# Patient Record
Sex: FEMALE | Race: WHITE | Employment: OTHER | URBAN - METROPOLITAN AREA
[De-identification: names, ages, dates, MRNs, and addresses within clinical notes are randomized per-mention and may not be internally consistent; named-entity substitution may affect disease eponyms.]

---

## 2017-01-16 DIAGNOSIS — E53.8 VITAMIN B12 DEFICIENCY (NON ANEMIC): ICD-10-CM

## 2017-01-16 DIAGNOSIS — F03.90 DEMENTIA (H): Primary | ICD-10-CM

## 2017-02-03 ENCOUNTER — TELEPHONE (OUTPATIENT)
Dept: INTERNAL MEDICINE | Facility: CLINIC | Age: 82
End: 2017-02-03

## 2017-02-03 NOTE — TELEPHONE ENCOUNTER
Spoke with Nnamdi.  They received a summons to appear in court on 2-08-17 re [facility] membership that someone tried to sell pt.  Nnamdi is bringing a letter from his brother, who is a , that he would like for PMD to look over and sign, to excuse Camille from court due to her dementia/alzheimers.  Nnamdi, who is TALYA will be going in her place.   Will await letter.

## 2017-02-06 ENCOUNTER — MYC MEDICAL ADVICE (OUTPATIENT)
Dept: INTERNAL MEDICINE | Facility: CLINIC | Age: 82
End: 2017-02-06

## 2017-02-06 NOTE — Clinical Note
[address] [address]  [phone]        February 7, 2017    [facility] Conciliation Court  Room [address] [facility] [address]    Patient: Camille Vang  Re: Alice Vang and Shamar Torres  Court File Number: [id number]  Conciliation Hearing Date: February 8, 2017 at 9:00 a.m.      Your Honor:    Camille Vang has been one of my patients since October 2014 when I was first asked to evaluate her for issues of memory loss. Camille  has been incapable of managing her own financial affairs, driving a car and performing a number of activities of daily living since then. She has a considerable amount of memory loss and is currently  being treated with Donepizil/Aricept to slow the progression of this disease.    In my opinion as her physician, it would not be advisable for Camille to attend and testify at the Conciliation Hearing that is described above. The proceedings would likely be very stressful for her and she would not understand them fully. She also has a history of some urinary incontinence and the stress of attending the hearing might cause her to have an incontinence episode during the hearing, which would be embarrassing and increasingly  stressful for her. If you have further questions regarding this matter, please feel free to contact me at [phone].  Sincerely,        Jeevan Obando MD  Internal Medicine Department  [facility]

## 2017-06-07 DIAGNOSIS — E03.9 HYPOTHYROIDISM: ICD-10-CM

## 2017-06-07 RX ORDER — LEVOTHYROXINE SODIUM 112 UG/1
TABLET ORAL
Qty: 15 TABLET | Refills: 0 | Status: SHIPPED | OUTPATIENT
Start: 2017-06-07 | End: 2017-10-10

## 2017-06-07 NOTE — TELEPHONE ENCOUNTER
levothyroxine (SYNTHROID, LEVOTHROID) 112 MCG tablet     Last Written Prescription Date: 11/04/2016  Last Quantity: 45, # refills: 1  Last Office Visit with [facility], [facility] or [facility] prescribing provider: 11/11/2016        TSH   Date Value Ref Range Status   03/28/2016 2.36 0.40 - 4.00 mU/L Final

## 2017-06-17 ENCOUNTER — HEALTH MAINTENANCE LETTER (OUTPATIENT)
Age: 82
End: 2017-06-17

## 2017-06-26 ENCOUNTER — MEDICAL CORRESPONDENCE (OUTPATIENT)
Dept: HEALTH INFORMATION MANAGEMENT | Facility: CLINIC | Age: 82
End: 2017-06-26

## 2017-06-27 ENCOUNTER — TRANSFERRED RECORDS (OUTPATIENT)
Dept: HEALTH INFORMATION MANAGEMENT | Facility: CLINIC | Age: 82
End: 2017-06-27

## 2017-08-21 ENCOUNTER — TRANSFERRED RECORDS (OUTPATIENT)
Dept: HEALTH INFORMATION MANAGEMENT | Facility: CLINIC | Age: 82
End: 2017-08-21

## 2017-08-21 LAB
CREAT SERPL-MCNC: 0.85 MG/DL (ref 0.6–1.1)
CREAT SERPL-MCNC: 0.85 MG/DL (ref 0.6–1.1)
GFR SERPL CREATININE-BSD FRML MDRD: >60 ML/MIN/1.73M2
GFR SERPL CREATININE-BSD FRML MDRD: >60 ML/MIN/1.73M2
GLUCOSE SERPL-MCNC: 94 MG/DL (ref 70–125)
GLUCOSE SERPL-MCNC: 94 MG/DL (ref 70–125)
INR PPP: 1.07 (ref 0.9–1.1)
POTASSIUM SERPL-SCNC: 4.3 MMOL/L (ref 3.5–5)
POTASSIUM SERPL-SCNC: 4.3 MMOL/L (ref 3.5–5)

## 2017-08-22 ENCOUNTER — TELEPHONE (OUTPATIENT)
Dept: INTERNAL MEDICINE | Facility: CLINIC | Age: 82
End: 2017-08-22

## 2017-08-22 NOTE — TELEPHONE ENCOUNTER
Reason for Call:  Other call back    Detailed comments: A nurse from [facility] is calling about the pt's B12 shot.  They use an outside service to give the shots.  In order for the sevice to give the shot, the diagnosis code needs to be changed to permicious anemia.  Fax # is [phone].  Phone Number Patient can be reached at: Other phone number:  [phone]  Alice    Best Time: before 3:30pm    Can we leave a detailed message on this number? YES    Call taken on 8/22/2017 at 3:16 PM by ARJUN BARROS

## 2017-08-22 NOTE — TELEPHONE ENCOUNTER
See same request 11/11/16. Pt does NOT have pernicious anemia and dx will NOT be changed to that.

## 2017-08-23 ENCOUNTER — TELEPHONE (OUTPATIENT)
Dept: INTERNAL MEDICINE | Facility: CLINIC | Age: 82
End: 2017-08-23

## 2017-08-23 NOTE — TELEPHONE ENCOUNTER
NO information in chart available to review re: any recent hospital visit.  OK for Tylenol 1000mg TID scheduled for pain.  If not controlling sx, then pt to be seen in clinic by me to evaluate further. Inform nurse from [facility] and also have her fax any information they have re: where pt was  seen yesterday for evaluation

## 2017-08-23 NOTE — TELEPHONE ENCOUNTER
Alice, nurse from [facility] called and stated that patient returned from the hospital yesterday post fall. Patient has no fx to left arm but it is in a sling. PRN dosage of Tylenol is not helping the patient with the pain and nurse is asking for PCP to schedule the Tylenol.    PCP please review and advice.    Thank you

## 2017-08-24 ENCOUNTER — TELEPHONE (OUTPATIENT)
Dept: INTERNAL MEDICINE | Facility: CLINIC | Age: 82
End: 2017-08-24

## 2017-08-24 DIAGNOSIS — Z91.81 PERSONAL HISTORY OF FALL: Primary | ICD-10-CM

## 2017-08-24 DIAGNOSIS — F03.90 DEMENTIA WITHOUT BEHAVIORAL DISTURBANCE, UNSPECIFIED DEMENTIA TYPE: ICD-10-CM

## 2017-08-24 NOTE — TELEPHONE ENCOUNTER
Patient was admitted into [facility] after a fall, Doctor thought it would be a great time for patient to start using a walker. Hospital suggested that patient contact PCP to initiate request. Son is requesting a walker w/seat, wheels in front only sent to [facility] in [place] Address: [address] [place] [address], Phone: [phone], Fax: [phone]

## 2017-08-24 NOTE — TELEPHONE ENCOUNTER
Have not seen a 2 wheel walker that also comes with a seat. Would recommend 4 wheel walker with brakes and seat. Rx for that printed and in [place] basket. Please fax to [facility] as requested. Inform pt's son.

## 2017-08-24 NOTE — TELEPHONE ENCOUNTER
Writer called and updated nurse Alise. Alise verbalized she will fax over the records. Patient was seen in a [facility]. Gave a verbal order for the tylenol.     Pending fax from the AL.

## 2017-08-25 NOTE — TELEPHONE ENCOUNTER
The patient's Nursing Home has been notified of this information and all questions answered. Will contact family to let them know.

## 2017-09-20 DIAGNOSIS — E53.8 VITAMIN B12 DEFICIENCY (NON ANEMIC): ICD-10-CM

## 2017-09-21 RX ORDER — CYANOCOBALAMIN 1000 UG/ML
INJECTION, SOLUTION INTRAMUSCULAR; SUBCUTANEOUS
Qty: 1 ML | Refills: 11 | Status: SHIPPED | OUTPATIENT
Start: 2017-09-21 | End: 2018-04-21

## 2017-09-21 NOTE — TELEPHONE ENCOUNTER
b12        Last Written Prescription Date: 11/6/16  Last Fill Quantity: 1,    # refills: 11  Last Office Visit with [facility] or [facility] prescribing provider:  11/11/17      Lab Results   Component Value Date    WBC 6.0 03/28/2016     Lab Results   Component Value Date    RBC 3.88 03/28/2016     Lab Results   Component Value Date    HGB 13.2 03/28/2016     Lab Results   Component Value Date    HCT 39.8 03/28/2016     No components found for: MCT  Lab Results   Component Value Date     03/28/2016     Lab Results   Component Value Date    MCH 34.0 03/28/2016     Lab Results   Component Value Date    MCHC 33.2 03/28/2016     Lab Results   Component Value Date    RDW 11.9 03/28/2016     Lab Results   Component Value Date     03/28/2016     Lab Results   Component Value Date    AST 20 03/28/2016     Lab Results   Component Value Date    ALT 25 03/28/2016     Creatinine   Date Value Ref Range Status   08/21/2017 0.85 0.60 - 1.10 mg/dL Final

## 2017-09-30 ENCOUNTER — HEALTH MAINTENANCE LETTER (OUTPATIENT)
Age: 82
End: 2017-09-30

## 2017-10-03 DIAGNOSIS — F03.90 DEMENTIA WITHOUT BEHAVIORAL DISTURBANCE, UNSPECIFIED DEMENTIA TYPE: ICD-10-CM

## 2017-10-03 RX ORDER — DONEPEZIL HYDROCHLORIDE 10 MG/1
TABLET, FILM COATED ORAL
Qty: 30 TABLET | Refills: 1 | Status: SHIPPED | OUTPATIENT
Start: 2017-10-03 | End: 2017-12-03

## 2017-10-10 DIAGNOSIS — E53.8 B12 DEFICIENCY: ICD-10-CM

## 2017-10-10 DIAGNOSIS — E03.9 HYPOTHYROIDISM, UNSPECIFIED TYPE: Primary | ICD-10-CM

## 2017-10-10 DIAGNOSIS — M19.91 PRIMARY OSTEOARTHRITIS, UNSPECIFIED SITE: ICD-10-CM

## 2017-10-10 NOTE — TELEPHONE ENCOUNTER
MAPA 500 mg      Last Written Prescription Date:  11/9/16  Last Fill Quantity: 100,   # refills: 11  Last Office Visit with [facility] or [facility] prescribing provider: 11/11/16  Future Office visit:       Medication is being filled for 1 time refill only due to:  Patient needs to be seen because will be due for office visit prior to next refill. .      Levothyroxine      Last Written Prescription Date: 6/7/17  Last Quantity: 15, # refills: 0  Last Office Visit with [facility] or [facility] prescribing provider: 11/11/16        TSH   Date Value Ref Range Status   03/28/2016 2.36 0.40 - 4.00 mU/L Final       Routing refill request to provider for review/approval because:  Karen given x1 and patient did not follow up, please advise

## 2017-10-10 NOTE — LETTER
.[facility]  [address] [address]  [phone] (appt)  [phone] (nurse line)    October 13, 2017      Camille Vang                                                                                                                   [facility] [facility] [address]              Dear Camille,    In reviewing your recent refill request for MAPAP 500 MG tablet and Levothyroxine , it was noted that you are due for the following:     Follow-up and Lab appointment with your physician within the next 4 weeks    Approval for a 30-day supply of the medication has been sent to your pharmacy.  Additional refills will be approved at your follow up visit.     Please contact our office at [phone] to schedule your doctor's appointment.          Sincerely,      [facility]

## 2017-10-11 RX ORDER — LEVOTHYROXINE SODIUM 112 UG/1
TABLET ORAL
Qty: 15 TABLET | Refills: 0 | Status: SHIPPED | OUTPATIENT
Start: 2017-10-11 | End: 2018-01-05 | Stop reason: DRUGHIGH

## 2017-10-11 NOTE — TELEPHONE ENCOUNTER
Call pt's son. Pt last sen Nov 2016 and last thyroid labs > 1 year ago./ Levothyroxine Rx done for 30 days. Pt will need f/u labs done in lab in the next  3 weeks and then see me a few days later if that is possible or, if hard getitng pt to clinic twice, then pt to see me within the next 4 weeks for follow-up and will then have to have labs drawn at that appt and address med refills once lab results back

## 2017-10-13 NOTE — TELEPHONE ENCOUNTER
Patient's son is notified via detailed voicemail. Has CTC on file. Letter will be sent in addition to message sent.  Encounter Closed

## 2017-10-18 DIAGNOSIS — R51.9 CHRONIC NONINTRACTABLE HEADACHE, UNSPECIFIED HEADACHE TYPE: ICD-10-CM

## 2017-10-18 DIAGNOSIS — G89.29 CHRONIC NONINTRACTABLE HEADACHE, UNSPECIFIED HEADACHE TYPE: ICD-10-CM

## 2017-10-18 NOTE — TELEPHONE ENCOUNTER
Gabapentin 100 mg      Last Written Prescription Date:  11/6/16  Last Fill Quantity: 60,   # refills: 11  Future Office visit:       Routing refill request to provider for review/approval because:  Drug not on the [facility], [facility] or [facility] refill protocol or controlled substance

## 2017-10-19 NOTE — TELEPHONE ENCOUNTER
[facility] is calling for pt, requesting RX refill for gabapentin (they say she is out of pills).  RX is pending, please refill if appropriate.      Once RX is filled,  Senior Living needs a copy of RX faxed over for their files.  Please print order for gabapentin (NEURONTIN) 100 MG capsule, and then  FAX  # [phone].    LOV with Dr. Obando was 11/11/16.

## 2017-10-20 RX ORDER — GABAPENTIN 100 MG/1
CAPSULE ORAL
Qty: 60 CAPSULE | Refills: 1 | Status: SHIPPED | OUTPATIENT
Start: 2017-10-20 | End: 2017-12-01

## 2017-10-20 NOTE — TELEPHONE ENCOUNTER
Gabapentin  Refilled electronically to [facility] as loaded by nursing staff. I am out of the office and not able to print a copy of the Rx  And sign to send to [facility]. RF done for 1 month and 1 additional refill. > 1 year since pt seen in clinic and son has been notified per refill 1010/17 that pt needs a f/u appt with me in clinic. May request that a partner in clinic today reprint the Gabapentin Rx and sign to have faxed to [facility] or contact them to let them know Rx has been refilled and they can send us any necessary form next week  that I would need to sign  In the future confirming that pt still on Gabapentin though seems unnecessary if pt's medical records there already show that pt on that med or should be able to take verbal order from us that pt to remain on Gabapntin for now pending future appt with me

## 2017-11-07 ENCOUNTER — TELEPHONE (OUTPATIENT)
Dept: INTERNAL MEDICINE | Facility: CLINIC | Age: 82
End: 2017-11-07

## 2017-11-07 NOTE — TELEPHONE ENCOUNTER
OK for UA/UC. Have results faxed to clinic when available to review. If sx worsen between now and when receive lab results, then pt to be seen

## 2017-11-07 NOTE — TELEPHONE ENCOUNTER
Nurse at senior living facility calling and asking if they can do an UA/UC on patient.  States that for the past 2 days she is acting more confused and agitated.  Afebrile.  Would PCP okay?

## 2017-11-09 ENCOUNTER — TRANSFERRED RECORDS (OUTPATIENT)
Dept: HEALTH INFORMATION MANAGEMENT | Facility: CLINIC | Age: 82
End: 2017-11-09

## 2017-11-11 DIAGNOSIS — M19.91 PRIMARY OSTEOARTHRITIS, UNSPECIFIED SITE: ICD-10-CM

## 2017-11-14 RX ORDER — ACETAMINOPHEN 500 MG
TABLET ORAL
Qty: 180 TABLET | Refills: 5 | Status: SHIPPED | OUTPATIENT
Start: 2017-11-14 | End: 2018-04-25

## 2017-12-01 ENCOUNTER — OFFICE VISIT (OUTPATIENT)
Dept: INTERNAL MEDICINE | Facility: CLINIC | Age: 82
End: 2017-12-01
Payer: MEDICARE

## 2017-12-01 VITALS
TEMPERATURE: 98.9 F | DIASTOLIC BLOOD PRESSURE: 62 MMHG | WEIGHT: 161 LBS | HEART RATE: 88 BPM | OXYGEN SATURATION: 94 % | SYSTOLIC BLOOD PRESSURE: 110 MMHG | BODY MASS INDEX: 29.45 KG/M2

## 2017-12-01 DIAGNOSIS — Z23 NEED FOR PNEUMOCOCCAL VACCINATION: ICD-10-CM

## 2017-12-01 DIAGNOSIS — F03.90 DEMENTIA WITHOUT BEHAVIORAL DISTURBANCE, UNSPECIFIED DEMENTIA TYPE: ICD-10-CM

## 2017-12-01 DIAGNOSIS — E03.9 HYPOTHYROIDISM, UNSPECIFIED TYPE: ICD-10-CM

## 2017-12-01 DIAGNOSIS — Z00.00 MEDICARE ANNUAL WELLNESS VISIT, INITIAL: Primary | ICD-10-CM

## 2017-12-01 DIAGNOSIS — Z23 NEED FOR PROPHYLACTIC VACCINATION AND INOCULATION AGAINST INFLUENZA: ICD-10-CM

## 2017-12-01 DIAGNOSIS — E53.8 B12 DEFICIENCY: ICD-10-CM

## 2017-12-01 LAB
ANION GAP SERPL CALCULATED.3IONS-SCNC: 8 MMOL/L (ref 3–14)
BUN SERPL-MCNC: 17 MG/DL (ref 7–30)
CALCIUM SERPL-MCNC: 9.3 MG/DL (ref 8.5–10.1)
CHLORIDE SERPL-SCNC: 102 MMOL/L (ref 94–109)
CO2 SERPL-SCNC: 27 MMOL/L (ref 20–32)
CREAT SERPL-MCNC: 0.86 MG/DL (ref 0.52–1.04)
ERYTHROCYTE [DISTWIDTH] IN BLOOD BY AUTOMATED COUNT: 12.5 % (ref 10–15)
GFR SERPL CREATININE-BSD FRML MDRD: 63 ML/MIN/1.7M2
GLUCOSE SERPL-MCNC: 96 MG/DL (ref 70–99)
HCT VFR BLD AUTO: 39.2 % (ref 35–47)
HGB BLD-MCNC: 13.4 G/DL (ref 11.7–15.7)
MCH RBC QN AUTO: 35.4 PG (ref 26.5–33)
MCHC RBC AUTO-ENTMCNC: 34.2 G/DL (ref 31.5–36.5)
MCV RBC AUTO: 103 FL (ref 78–100)
PLATELET # BLD AUTO: 212 10E9/L (ref 150–450)
POTASSIUM SERPL-SCNC: 4.5 MMOL/L (ref 3.4–5.3)
RBC # BLD AUTO: 3.79 10E12/L (ref 3.8–5.2)
SODIUM SERPL-SCNC: 137 MMOL/L (ref 133–144)
T4 FREE SERPL-MCNC: 0.94 NG/DL (ref 0.76–1.46)
TSH SERPL DL<=0.005 MIU/L-ACNC: 4.69 MU/L (ref 0.4–4)
VIT B12 SERPL-MCNC: 1125 PG/ML (ref 193–986)
WBC # BLD AUTO: 8.4 10E9/L (ref 4–11)

## 2017-12-01 PROCEDURE — 80048 BASIC METABOLIC PNL TOTAL CA: CPT | Performed by: INTERNAL MEDICINE

## 2017-12-01 PROCEDURE — 90662 IIV NO PRSV INCREASED AG IM: CPT | Performed by: INTERNAL MEDICINE

## 2017-12-01 PROCEDURE — 82607 VITAMIN B-12: CPT | Performed by: INTERNAL MEDICINE

## 2017-12-01 PROCEDURE — 84439 ASSAY OF FREE THYROXINE: CPT | Performed by: INTERNAL MEDICINE

## 2017-12-01 PROCEDURE — G0008 ADMIN INFLUENZA VIRUS VAC: HCPCS | Performed by: INTERNAL MEDICINE

## 2017-12-01 PROCEDURE — 85027 COMPLETE CBC AUTOMATED: CPT | Performed by: INTERNAL MEDICINE

## 2017-12-01 PROCEDURE — 84443 ASSAY THYROID STIM HORMONE: CPT | Performed by: INTERNAL MEDICINE

## 2017-12-01 PROCEDURE — 90670 PCV13 VACCINE IM: CPT | Performed by: INTERNAL MEDICINE

## 2017-12-01 PROCEDURE — G0009 ADMIN PNEUMOCOCCAL VACCINE: HCPCS | Performed by: INTERNAL MEDICINE

## 2017-12-01 PROCEDURE — 36415 COLL VENOUS BLD VENIPUNCTURE: CPT | Performed by: INTERNAL MEDICINE

## 2017-12-01 PROCEDURE — G0439 PPPS, SUBSEQ VISIT: HCPCS | Performed by: INTERNAL MEDICINE

## 2017-12-01 NOTE — NURSING NOTE
"Chief Complaint   Patient presents with     Physical       Initial /62  Pulse 88  Temp 98.9  F (37.2  C) (Oral)  Wt 161 lb (73 kg)  SpO2 94%  BMI 29.45 kg/m2 Estimated body mass index is 29.45 kg/(m^2) as calculated from the following:    Height as of 3/28/16: 5' 2\" (1.575 m).    Weight as of this encounter: 161 lb (73 kg).  Medication Reconciliation: complete  "

## 2017-12-01 NOTE — PATIENT INSTRUCTIONS
Labs as ordered     Flu vaccine    Prevnar vaccine    Will stopping  Gabapentin to see if headaches remain absent    Will continue other medications    Loratidine/Clarirtin 10mg tab daily or Allegra/fexofenadine 180mg daily daily (bothover the counter) as needed for runny nose if bothersome. If upper respiratory symptomss worsens, then contact clinic

## 2017-12-01 NOTE — NURSING NOTE
Screening Questionnaire for Adult Immunization    Are you sick today?   No   Do you have allergies to medications, food, a vaccine component or latex?   No   Have you ever had a serious reaction after receiving a vaccination?   No   Do you have a long-term health problem with heart disease, lung disease, asthma, kidney disease, metabolic disease (e.g. diabetes), anemia, or other blood disorder?   No   Do you have cancer, leukemia, HIV/AIDS, or any other immune system problem?   No   In the past 3 months, have you taken medications that affect  your immune system, such as prednisone, other steroids, or anticancer drugs; drugs for the treatment of rheumatoid arthritis, Crohn s disease, or psoriasis; or have you had radiation treatments?   No   Have you had a seizure, or a brain or other nervous system problem?   No   During the past year, have you received a transfusion of blood or blood     products, or been given immune (gamma) globulin or antiviral drug?   No   For women: Are you pregnant or is there a chance you could become        pregnant during the next month?   No   Have you received any vaccinations in the past 4 weeks?   No     Immunization questionnaire answers were all negative.        Per orders of Dr. Obando, injection of [id number] given by Kelly Kaur. Patient instructed to remain in clinic for 15 minutes afterwards, and to report any adverse reaction to me immediately.       Screening performed by Kelly Kaur on 12/1/2017 at 12:01 PM.

## 2017-12-01 NOTE — MR AVS SNAPSHOT
After Visit Summary   12/1/2017    Camille Vang    MRN: [id number]           Patient Information     Date Of Birth          12/21/1931        Visit Information        Provider Department      12/1/2017 11:30 AM Jeevan Obando MD [facility]        Today's Diagnoses     Need for pneumococcal vaccination    -  1    Need for prophylactic vaccination and inoculation against influenza        B12 deficiency        Primary osteoarthritis, unspecified site        Hypothyroidism, unspecified type          Care Instructions       Labs as ordered     Flu vaccine    Prevnar vaccine    Will stopping  Gabapentin to see if headaches remain absent    Will continue other medications    Loratidine/Clarirtin 10mg tab daily or Allegra/fexofenadine 180mg daily daily (botherover the counter) as needed for runny nose if bothersome. If upper respiratory symptomss worsens, then contact clinic          Follow-ups after your visit        Who to contact     If you have questions or need follow up information about today's clinic visit or your schedule please contact [facility] directly at [phone].  Normal or non-critical lab and imaging results will be communicated to you by [employer], letter or phone within 4 business days after the clinic has received the results. If you do not hear from us within 7 days, please contact the clinic through [employer] or phone. If you have a critical or abnormal lab result, we will notify you by phone as soon as possible.  Submit refill requests through [employer] or call your pharmacy and they will forward the refill request to us. Please allow 3 business days for your refill to be completed.          Additional Information About Your Visit        [employer] Information     [employer] gives you secure access to your electronic health record. If you see a primary care provider, you can also send messages to your care team and make appointments. If you  "have questions, please call your primary care clinic.  If you do not have a primary care provider, please call [phone] and they will assist you.        Care EveryWhere ID     This is your Care EveryWhere ID. This could be used by other organizations to access your [facility] medical records  [id number]        Your Vitals Were     Pulse Temperature Pulse Oximetry BMI (Body Mass Index)          88 98.9  F (37.2  C) (Oral) 94% 29.45 kg/m2         Blood Pressure from Last 3 Encounters:   12/01/17 110/62   11/11/16 108/52   08/05/16 110/62    Weight from Last 3 Encounters:   12/01/17 161 lb (73 kg)   11/11/16 146 lb (66.2 kg)   08/05/16 142 lb (64.4 kg)              We Performed the Following     ADMIN INFLUENZA (For MEDICARE Patients ONLY) []     ADMIN PNEUMOVAX VACCINE (For MEDICARE Patients ONLY) []     Basic metabolic panel     CBC with platelets     FLU VACCINE, INCREASED ANTIGEN, PRESV FREE, AGE 65+ [50227]     Pneumococcal vaccine 13 valent PCV13 IM (Prevnar) [33368]     TSH with free T4 reflex     Vaccine Administration, Initial [97408]     Vitamin B12          Today's Medication Changes          These changes are accurate as of: 12/1/17 12:15 PM.  If you have any questions, ask your nurse or doctor.               Stop taking these medicines if you haven't already. Please contact your care team if you have questions.     gabapentin 100 MG capsule   Commonly known as:  NEURONTIN   Stopped by:  Jeevan Obando MD           Syringe Luer Lock 25G X 1\" 3 ML Misc   Stopped by:  Jeevan Obando MD                    Primary Care Provider Office Phone # Fax #    Jeevan Obando [phone]       [address] [place] [address]        Equal Access to Services     HANANE PANIAGUA : katt [place], diogenes moya. So [facility] [phone].    ATENCIÓN: Si habla español, tiene a van disposición servicios gratuitos de " asistencia lingüística. Key[facility] al [phone].    We comply with applicable federal civil rights laws and [place] laws. We do not discriminate on the basis of race, color, national origin, age, disability, sex, sexual orientation, or gender identity.            Thank you!     Thank you for choosing [facility]  for your care. Our goal is always to provide you with excellent care. Hearing back from our patients is one way we can continue to improve our services. Please take a few minutes to complete the written survey that you may receive in the mail after your visit with us. Thank you!             Your Updated Medication List - Protect others around you: Learn how to safely use, store and throw away your medicines at www.disposemymeds.org.          This list is accurate as of: 12/1/17 12:15 PM.  Always use your most recent med list.                   Brand Name Dispense Instructions for use Diagnosis    acetaminophen 500 MG tablet    MAPAP    180 tablet    2 tablets every 8 hours as needed for pain. Max 6 tabs/24 hrs    Primary osteoarthritis, unspecified site       calcium carbonate 1500 (600 CA) MG tablet    CALCIUM 600    60 tablet    Take 1 tablet (600 mg) by mouth 2 times daily (with meals)    Osteoporosis       cyanocobalamin 1000 MCG/ML injection    VITAMIN B12    1 mL    INJECT 1ML (1000MCG) IM EVERY 30 DAYS (20TH OF EACH MONTH)    Vitamin B12 deficiency (non anemic)       donepezil 10 MG tablet    ARICEPT    30 tablet    1 TAB BY MOUTH AT BEDTIME    Dementia without behavioral disturbance, unspecified dementia type       levothyroxine 112 MCG tablet    SYNTHROID/LEVOTHROID    15 tablet    1/2 TAB (56MCG) BY MOUTH DAILY    Hypothyroidism, unspecified type       order for DME     1 Device    4 wheel walker with brakes and seat    Personal history of fall, Dementia without behavioral disturbance, unspecified dementia type       RA ONE DAILY GUMMY VITES Chew     70 tablet    Take 1  chew tab by mouth daily    Vitamin deficiency

## 2017-12-03 DIAGNOSIS — F03.90 DEMENTIA WITHOUT BEHAVIORAL DISTURBANCE, UNSPECIFIED DEMENTIA TYPE: ICD-10-CM

## 2017-12-05 RX ORDER — DONEPEZIL HYDROCHLORIDE 10 MG/1
TABLET, FILM COATED ORAL
Qty: 30 TABLET | Refills: 11 | Status: SHIPPED | OUTPATIENT
Start: 2017-12-05 | End: 2018-05-01 | Stop reason: DRUGHIGH

## 2017-12-30 DIAGNOSIS — E03.9 HYPOTHYROIDISM, UNSPECIFIED TYPE: ICD-10-CM

## 2018-01-01 NOTE — TELEPHONE ENCOUNTER
Requested Prescriptions   Pending Prescriptions Disp Refills     levothyroxine (SYNTHROID/LEVOTHROID) 112 MCG tablet [Pharmacy Med Name: LEVOTHYROXINE SODIUM 112MCG TABLET]  Last Written Prescription Date:  10/11/17  Last Fill Quantity: 15 tablet,  # refills: 0   Last Office Visit with [facility], [facility] or [facility] prescribing provider:  17   Future Office Visit:      15 tablet 11     Si/2 TAB (56MCG) BY MOUTH DAILY    Thyroid Protocol Failed    2017 12:08 PM       Failed - Normal TSH on file in past 12 months    Recent Labs   Lab Test  17   1220   TSH  4.69*           Passed - Patient is 12 years or older       Passed - Recent or future visit with authorizing provider's specialty    Patient had office visit in the last year or has a visit in the next 30 days with authorizing provider.  See chart review.        Passed - No active pregnancy on record    If patient is pregnant or has had a positive pregnancy test, please check TSH.       Passed - No positive pregnancy test in past 12 months    If patient is pregnant or has had a positive pregnancy test, please check TSH.

## 2018-01-05 RX ORDER — LEVOTHYROXINE SODIUM 112 UG/1
TABLET ORAL
Qty: 15 TABLET | Refills: 11 | OUTPATIENT
Start: 2018-01-05

## 2018-01-05 RX ORDER — LEVOTHYROXINE SODIUM 75 UG/1
75 TABLET ORAL DAILY
Qty: 90 TABLET | Refills: 3 | Status: SHIPPED | OUTPATIENT
Start: 2018-01-05 | End: 2018-04-25

## 2018-01-05 NOTE — TELEPHONE ENCOUNTER
Recent thyroid function noted to be a little low. Was previously on Levothyroxine 112mcg tab, 1/2 tab daily. Stopping that dose and increasing to 75mcg tab, 1 tab daily for thyroid function. Pt will need a repeat nonfasting thyroid lab  Drawn in 6 weeks. Pt now living in an assisted living. Call pt's son Nnamdi  (see demographics) to get name of assisted living site pt living at now and call them to make them aware of dose change since I think assisted living staff helping set up pt meds. Son to set up lab appt for pt in 6 weeks

## 2018-01-12 NOTE — TELEPHONE ENCOUNTER
Nnamdi informed. Also informed Assisted Living nurse- [phone]. Patient has been taking 75 mcg daily since 1/5. Nnamdi was wondering if patient's [facility] can draw the labs there. [facility] nurse is able to do this and send to their outside lab and fax us the results. They will draw the TSH with free T4 reflex in 6 weeks from 1/5/18 and fax us the results. They will also fax the order for signing.

## 2018-01-29 ENCOUNTER — TELEPHONE (OUTPATIENT)
Dept: NURSING | Facility: CLINIC | Age: 83
End: 2018-01-29

## 2018-01-29 DIAGNOSIS — Z20.828 EXPOSURE TO THE FLU: Primary | ICD-10-CM

## 2018-01-29 RX ORDER — OSELTAMIVIR PHOSPHATE 75 MG/1
75 CAPSULE ORAL DAILY
Qty: 10 CAPSULE | Refills: 0 | Status: SHIPPED | OUTPATIENT
Start: 2018-01-29 | End: 2018-02-08

## 2018-01-30 DIAGNOSIS — R51.9 CHRONIC NONINTRACTABLE HEADACHE, UNSPECIFIED HEADACHE TYPE: Primary | ICD-10-CM

## 2018-01-30 DIAGNOSIS — R51.9 CHRONIC NONINTRACTABLE HEADACHE, UNSPECIFIED HEADACHE TYPE: ICD-10-CM

## 2018-01-30 DIAGNOSIS — G89.29 CHRONIC NONINTRACTABLE HEADACHE, UNSPECIFIED HEADACHE TYPE: ICD-10-CM

## 2018-01-30 DIAGNOSIS — G89.29 CHRONIC NONINTRACTABLE HEADACHE, UNSPECIFIED HEADACHE TYPE: Primary | ICD-10-CM

## 2018-01-30 RX ORDER — GABAPENTIN 100 MG/1
CAPSULE ORAL
Qty: 60 CAPSULE | Refills: 1 | OUTPATIENT
Start: 2018-01-30

## 2018-01-30 RX ORDER — GABAPENTIN 100 MG/1
CAPSULE ORAL
Qty: 60 CAPSULE | Refills: 11 | Status: SHIPPED | OUTPATIENT
Start: 2018-01-30 | End: 2018-04-25

## 2018-01-30 NOTE — TELEPHONE ENCOUNTER
gabapentin (NEURONTIN) 100 MG capsule       Last Written Prescription Date:  10/20/2017  Last Fill Quantity: 60,   # refills: 1  Last Office Visit: 12/01/2017  Future Office visit:       Routing refill request to provider for review/approval because:  Drug not on the [facility], [facility] or [facility] refill protocol or controlled substance

## 2018-01-30 NOTE — TELEPHONE ENCOUNTER
Medication was taken off patient's med list at last visit as the patient's son was inadvertently thinking she was no longer taking it.  Obtained medication list from her assisted living today which confirms she has been taking gabapentin twice a day for chronic headache prevention.  Prescription refilled.

## 2018-01-30 NOTE — TELEPHONE ENCOUNTER
gabapentin        Routing refill request to provider for review/approval because:  Drug not active on patient's medication list

## 2018-02-20 DIAGNOSIS — M81.0 OSTEOPOROSIS: ICD-10-CM

## 2018-02-21 DIAGNOSIS — E56.9 VITAMIN DEFICIENCY: ICD-10-CM

## 2018-02-21 NOTE — TELEPHONE ENCOUNTER
Multiple Vitamins-Minerals (RA ONE DAILY GUMMY VITES) CHEW   Last Written Prescription Date: 11/8/16  Last Fill Quantity: 70, # refills: 4  Last Office Visit with [facility], [facility] or [facility] prescribing provider: 12/1/17       BP Readings from Last 3 Encounters:   12/01/17 110/62   11/11/16 108/52   08/05/16 110/62     Lab Results   Component Value Date    AST 20 03/28/2016     Lab Results   Component Value Date    ALT 25 03/28/2016     Creatinine   Date Value Ref Range Status   12/01/2017 0.86 0.52 - 1.04 mg/dL Final

## 2018-03-07 ENCOUNTER — TRANSFERRED RECORDS (OUTPATIENT)
Dept: HEALTH INFORMATION MANAGEMENT | Facility: CLINIC | Age: 83
End: 2018-03-07

## 2018-03-07 ENCOUNTER — TELEPHONE (OUTPATIENT)
Dept: NURSING | Facility: CLINIC | Age: 83
End: 2018-03-07

## 2018-03-07 NOTE — TELEPHONE ENCOUNTER
Noted. If pain that severe in abdomen that cannot be transported by private transport, then definitely needs to be seen in ER for CT abd, etc

## 2018-03-07 NOTE — TELEPHONE ENCOUNTER
Pt needs to be seen for exam by provider and will also need a CT scan of the abdomen to evaluate so imaging at assisted living with Xray will not be enough. Either pt to be seen by provider in clinic today as I am out of the office and then will have to get CT likely ordered with transport to hospital also depending on providers exam and work-up OR pt to be seen directly in ER today for examination.  Pt would have to be transported by pt's son and if seen in clinic, would need to be seen early enough today that could still get CT done today also so, unless son able to transport pt soon to clinic and appt made with provider in clinic today, ER probably the best option to address entire work-up needed, especially since pt has memory issues and so getting history will also likely be somewhat limited

## 2018-03-07 NOTE — TELEPHONE ENCOUNTER
Nnamdi (son) returning call to clinic to clarify need to go to ER.  Son states that Camille's pain is actually RUQ and that when his wife went to assess the situation that Camille had pointed to an area directly under her ribs.  He states that the pain does radiate to the back.   Son feels that this maybe more of a pulled muscle, so patient's son states he doesn't understand the need to go to the ER.   Son does admit that Camille's pain is so severe that his wife can't transport her via private transport.   He does understand that due to decreased oxygen (90%), and pain this severe, that Camille the ER has the best tools to assess what is going on and to give Camille the best care. Son states he will have assisted living call the ambulance.

## 2018-03-07 NOTE — TELEPHONE ENCOUNTER
"Patient's son Nnamdi calling back.  Stating he does not feel patient needs to be evaluated in the ER.  His spouse, daughter, and patient's sister have assessed the patient who do not feel she requires emergency treatment.  Writer inquired if any of the family members mentioned had medical backgrounds, which they do not.  Again, informed patient's son of possible risks for not seeking medical care.  He stated, \"It's probably just because she's in bed or the wheelchair all day.\"  He requested orders for Ibuprofen and PT.  Writer informed Nnamdi the patient still should be evaluated to appropriately treat symptoms.  Nnamdi became frustrated with writer.  Patient care supervisor then spoke with son.    "

## 2018-03-07 NOTE — TELEPHONE ENCOUNTER
Spoke with AL nurse and informed her of PCP's message.  Nurse understood and stated that they will send patient go to ER now.

## 2018-03-07 NOTE — TELEPHONE ENCOUNTER
AL nurse called back stating that patient's son, Nnamdi, did not want patient going to ER unless he felt it was appropriate.  Nnamdi is in [place] right now but stated he was going to send patient's sister over to AL facility to assess the situation as he feels his mother does not have high pain tolerance and may not need ED care.  Writer called Nnamdi and explained the urgency of patient being evaluated for symptoms.  Nnamdi understood and stated he is fine with patient going to hospital however did not feel ambulance was needed.  He will have patient's sister bring patient to [facility].

## 2018-03-07 NOTE — TELEPHONE ENCOUNTER
"Alice, nurse from [facility], calling with concerns.  She states that patient has had RLQ pain that radiates to the back x 3 days.  No fever and denies urinary symptoms at this time.  AL has been administrating Tylenol to treat patient's pain however pain still persists.  Alice is unsure what patient's pain rating is however states that she reports pain is \"harsh\" and \"comes and goes.\"  Patient states that it hurts to breath when pain presents.  O2 saturations are at 90%.  Patient is having a difficult time moving and is currently in bed.  No rebound tenderness.  Patient had 1 BM today and nurse did rectal check and found everything was clear.  Patient has been eating OK and drinking OK.  Writer advised that patient be seen in ER as PCP is not in clinic today to consult with regarding the matter.  Alice reports that imaging and lab can come to facility if that is what is needed.  PCP or partner please advise.  "

## 2018-03-12 ENCOUNTER — TRANSFERRED RECORDS (OUTPATIENT)
Dept: HEALTH INFORMATION MANAGEMENT | Facility: CLINIC | Age: 83
End: 2018-03-12

## 2018-03-12 ENCOUNTER — OFFICE VISIT - HEALTHEAST (OUTPATIENT)
Dept: GERIATRICS | Facility: CLINIC | Age: 83
End: 2018-03-12

## 2018-03-12 ENCOUNTER — COMMUNICATION - HEALTHEAST (OUTPATIENT)
Dept: NEUROSURGERY | Facility: CLINIC | Age: 83
End: 2018-03-12

## 2018-03-12 DIAGNOSIS — F02.80 LATE ONSET ALZHEIMER'S DISEASE WITHOUT BEHAVIORAL DISTURBANCE (H): ICD-10-CM
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
## 2018-03-12 DIAGNOSIS — S22.089A T12 VERTEBRAL FRACTURE (H): ICD-10-CM

## 2018-03-12 DIAGNOSIS — M81.0 OSTEOPOROSIS: ICD-10-CM

## 2018-03-12 DIAGNOSIS — S22.080A T12 COMPRESSION FRACTURE (H): ICD-10-CM

## 2018-03-12 DIAGNOSIS — M19.90 OSTEOARTHROSIS: ICD-10-CM

## 2018-03-12 DIAGNOSIS — E03.9 HYPOTHYROIDISM, UNSPECIFIED TYPE: ICD-10-CM

## 2018-03-12 DIAGNOSIS — G30.1 LATE ONSET ALZHEIMER'S DISEASE WITHOUT BEHAVIORAL DISTURBANCE (H): ICD-10-CM

## 2018-03-16 ENCOUNTER — OFFICE VISIT - HEALTHEAST (OUTPATIENT)
Dept: GERIATRICS | Facility: CLINIC | Age: 83
End: 2018-03-16

## 2018-03-16 ENCOUNTER — TRANSFERRED RECORDS (OUTPATIENT)
Dept: HEALTH INFORMATION MANAGEMENT | Facility: CLINIC | Age: 83
End: 2018-03-16

## 2018-03-16 DIAGNOSIS — M19.90 OSTEOARTHROSIS: ICD-10-CM
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
## 2018-03-16 DIAGNOSIS — S22.080A T12 COMPRESSION FRACTURE (H): ICD-10-CM

## 2018-03-16 DIAGNOSIS — M81.0 OSTEOPOROSIS: ICD-10-CM

## 2018-03-16 DIAGNOSIS — F02.80 LATE ONSET ALZHEIMER'S DISEASE WITHOUT BEHAVIORAL DISTURBANCE (H): ICD-10-CM

## 2018-03-16 DIAGNOSIS — G30.1 LATE ONSET ALZHEIMER'S DISEASE WITHOUT BEHAVIORAL DISTURBANCE (H): ICD-10-CM

## 2018-03-20 ENCOUNTER — OFFICE VISIT - HEALTHEAST (OUTPATIENT)
Dept: NEUROSURGERY | Facility: CLINIC | Age: 83
End: 2018-03-20

## 2018-03-20 ENCOUNTER — HOSPITAL ENCOUNTER (OUTPATIENT)
Dept: RADIOLOGY | Facility: CLINIC | Age: 83
Discharge: HOME | End: 2018-03-20
Attending: NEUROLOGICAL SURGERY

## 2018-03-20 ENCOUNTER — TELEPHONE (OUTPATIENT)
Dept: INTERNAL MEDICINE | Facility: CLINIC | Age: 83
End: 2018-03-20

## 2018-03-20 ENCOUNTER — TRANSFERRED RECORDS (OUTPATIENT)
Dept: HEALTH INFORMATION MANAGEMENT | Facility: CLINIC | Age: 83
End: 2018-03-20

## 2018-03-20 DIAGNOSIS — S22.080A T12 COMPRESSION FRACTURE (H): ICD-10-CM

## 2018-03-20 DIAGNOSIS — S22.089A T12 VERTEBRAL FRACTURE (H): ICD-10-CM

## 2018-03-20 NOTE — TELEPHONE ENCOUNTER
Compression T-12 fracture . At MD office for follow up  Of Fracture . Pt is back brace. MD asked if pt took anything for bone health . Actonel  In past . Calcium only now . They would like patient to discuss this with PCP. Please advise son as to medication to start.Ivette Anand RN

## 2018-03-21 ENCOUNTER — TRANSFERRED RECORDS (OUTPATIENT)
Dept: HEALTH INFORMATION MANAGEMENT | Facility: CLINIC | Age: 83
End: 2018-03-21

## 2018-03-21 ENCOUNTER — OFFICE VISIT - HEALTHEAST (OUTPATIENT)
Dept: GERIATRICS | Facility: CLINIC | Age: 83
End: 2018-03-21

## 2018-03-21 DIAGNOSIS — F02.80 LATE ONSET ALZHEIMER'S DISEASE WITHOUT BEHAVIORAL DISTURBANCE (H): ICD-10-CM

## 2018-03-21 DIAGNOSIS — S22.080A T12 COMPRESSION FRACTURE (H): ICD-10-CM

## 2018-03-21 DIAGNOSIS — G30.1 LATE ONSET ALZHEIMER'S DISEASE WITHOUT BEHAVIORAL DISTURBANCE (H): ICD-10-CM

## 2018-03-21 DIAGNOSIS — K59.09 CHRONIC CONSTIPATION: ICD-10-CM

## 2018-03-21 DIAGNOSIS — F32.0 MAJOR DEPRESSIVE DISORDER, SINGLE EPISODE, MILD (H): ICD-10-CM

## 2018-03-23 NOTE — TELEPHONE ENCOUNTER
Talked to pt's son,Nnamdi.    Due to recent compression fracture pt is at [facility]CU and her son said it will be very difficult to bring her to appt due to her mobility status. Son requesting if we can do an telephone encounter in the mean time.    Pt's TCU - Nurse line - [phone], Reception - [phone].  Nnamdi's # [phone].    Dr Obando, kindly advise.

## 2018-03-23 NOTE — TELEPHONE ENCOUNTER
Spoke with MD about patient and MD currently on phone trying to speak with TCU nurse regarding MD care while at TCU .Ivette Anand RN

## 2018-03-23 NOTE — TELEPHONE ENCOUNTER
Spoke with TCU. Pt is being followed by a Dr Ortiz while at the TCU. [facility] will do pt management until pt is discharged back to home and then can f/u with me back in clinic when outpt. Discussed  With pt's son Nnamdi

## 2018-03-26 ENCOUNTER — OFFICE VISIT - HEALTHEAST (OUTPATIENT)
Dept: GERIATRICS | Facility: CLINIC | Age: 83
End: 2018-03-26

## 2018-03-26 ENCOUNTER — TRANSFERRED RECORDS (OUTPATIENT)
Dept: HEALTH INFORMATION MANAGEMENT | Facility: CLINIC | Age: 83
End: 2018-03-26

## 2018-03-26 DIAGNOSIS — K59.09 CHRONIC CONSTIPATION: ICD-10-CM

## 2018-03-26 DIAGNOSIS — G30.1 LATE ONSET ALZHEIMER'S DISEASE WITHOUT BEHAVIORAL DISTURBANCE (H): ICD-10-CM

## 2018-03-26 DIAGNOSIS — F02.80 LATE ONSET ALZHEIMER'S DISEASE WITHOUT BEHAVIORAL DISTURBANCE (H): ICD-10-CM

## 2018-03-26 DIAGNOSIS — S22.080A T12 COMPRESSION FRACTURE (H): ICD-10-CM

## 2018-03-26 DIAGNOSIS — F32.0 MAJOR DEPRESSIVE DISORDER, SINGLE EPISODE, MILD (H): ICD-10-CM

## 2018-03-26 DIAGNOSIS — E03.9 HYPOTHYROIDISM, UNSPECIFIED TYPE: ICD-10-CM

## 2018-03-26 ASSESSMENT — MIFFLIN-ST. JEOR: SCORE: 1141.42

## 2018-04-02 ENCOUNTER — OFFICE VISIT - HEALTHEAST (OUTPATIENT)
Dept: GERIATRICS | Facility: CLINIC | Age: 83
End: 2018-04-02

## 2018-04-02 ENCOUNTER — TRANSFERRED RECORDS (OUTPATIENT)
Dept: HEALTH INFORMATION MANAGEMENT | Facility: CLINIC | Age: 83
End: 2018-04-02

## 2018-04-02 DIAGNOSIS — F32.0 MAJOR DEPRESSIVE DISORDER, SINGLE EPISODE, MILD (H): ICD-10-CM

## 2018-04-02 DIAGNOSIS — S22.080A T12 COMPRESSION FRACTURE (H): ICD-10-CM

## 2018-04-02 DIAGNOSIS — K59.09 CHRONIC CONSTIPATION: ICD-10-CM

## 2018-04-02 DIAGNOSIS — E03.9 HYPOTHYROIDISM, UNSPECIFIED TYPE: ICD-10-CM

## 2018-04-02 DIAGNOSIS — F02.80 LATE ONSET ALZHEIMER'S DISEASE WITHOUT BEHAVIORAL DISTURBANCE (H): ICD-10-CM

## 2018-04-02 DIAGNOSIS — G30.1 LATE ONSET ALZHEIMER'S DISEASE WITHOUT BEHAVIORAL DISTURBANCE (H): ICD-10-CM

## 2018-04-02 ASSESSMENT — MIFFLIN-ST. JEOR: SCORE: 1114.21

## 2018-04-06 ENCOUNTER — TRANSFERRED RECORDS (OUTPATIENT)
Dept: HEALTH INFORMATION MANAGEMENT | Facility: CLINIC | Age: 83
End: 2018-04-06

## 2018-04-06 ENCOUNTER — OFFICE VISIT - HEALTHEAST (OUTPATIENT)
Dept: GERIATRICS | Facility: CLINIC | Age: 83
End: 2018-04-06

## 2018-04-06 DIAGNOSIS — S22.080A T12 COMPRESSION FRACTURE (H): ICD-10-CM

## 2018-04-06 DIAGNOSIS — M81.0 OSTEOPOROSIS: ICD-10-CM

## 2018-04-06 DIAGNOSIS — E03.9 HYPOTHYROIDISM, UNSPECIFIED TYPE: ICD-10-CM

## 2018-04-06 DIAGNOSIS — G30.1 LATE ONSET ALZHEIMER'S DISEASE WITHOUT BEHAVIORAL DISTURBANCE (H): ICD-10-CM

## 2018-04-06 DIAGNOSIS — F32.0 MAJOR DEPRESSIVE DISORDER, SINGLE EPISODE, MILD (H): ICD-10-CM

## 2018-04-06 DIAGNOSIS — M19.90 OSTEOARTHROSIS: ICD-10-CM

## 2018-04-06 DIAGNOSIS — F02.80 LATE ONSET ALZHEIMER'S DISEASE WITHOUT BEHAVIORAL DISTURBANCE (H): ICD-10-CM

## 2018-04-06 DIAGNOSIS — K59.09 CHRONIC CONSTIPATION: ICD-10-CM

## 2018-04-06 ASSESSMENT — MIFFLIN-ST. JEOR: SCORE: 1125.09

## 2018-04-09 ENCOUNTER — TRANSFERRED RECORDS (OUTPATIENT)
Dept: HEALTH INFORMATION MANAGEMENT | Facility: CLINIC | Age: 83
End: 2018-04-09

## 2018-04-09 ENCOUNTER — OFFICE VISIT - HEALTHEAST (OUTPATIENT)
Dept: GERIATRICS | Facility: CLINIC | Age: 83
End: 2018-04-09

## 2018-04-09 DIAGNOSIS — F02.80 LATE ONSET ALZHEIMER'S DISEASE WITHOUT BEHAVIORAL DISTURBANCE (H): ICD-10-CM

## 2018-04-09 DIAGNOSIS — M19.90 OSTEOARTHROSIS: ICD-10-CM

## 2018-04-09 DIAGNOSIS — F32.0 MAJOR DEPRESSIVE DISORDER, SINGLE EPISODE, MILD (H): ICD-10-CM

## 2018-04-09 DIAGNOSIS — G30.1 LATE ONSET ALZHEIMER'S DISEASE WITHOUT BEHAVIORAL DISTURBANCE (H): ICD-10-CM

## 2018-04-09 DIAGNOSIS — K59.09 CHRONIC CONSTIPATION: ICD-10-CM

## 2018-04-09 DIAGNOSIS — M81.0 OSTEOPOROSIS: ICD-10-CM

## 2018-04-09 DIAGNOSIS — S22.080A T12 COMPRESSION FRACTURE (H): ICD-10-CM

## 2018-04-09 DIAGNOSIS — E03.9 HYPOTHYROIDISM, UNSPECIFIED TYPE: ICD-10-CM

## 2018-04-09 ASSESSMENT — MIFFLIN-ST. JEOR: SCORE: 1115.11

## 2018-04-11 ENCOUNTER — OFFICE VISIT - HEALTHEAST (OUTPATIENT)
Dept: GERIATRICS | Facility: CLINIC | Age: 83
End: 2018-04-11

## 2018-04-11 DIAGNOSIS — M19.90 OSTEOARTHROSIS: ICD-10-CM

## 2018-04-11 DIAGNOSIS — M81.0 OSTEOPOROSIS: ICD-10-CM

## 2018-04-11 DIAGNOSIS — E03.9 HYPOTHYROIDISM, UNSPECIFIED TYPE: ICD-10-CM

## 2018-04-11 DIAGNOSIS — S22.080A T12 COMPRESSION FRACTURE (H): ICD-10-CM

## 2018-04-11 DIAGNOSIS — F02.80 LATE ONSET ALZHEIMER'S DISEASE WITHOUT BEHAVIORAL DISTURBANCE (H): ICD-10-CM

## 2018-04-11 DIAGNOSIS — K59.09 CHRONIC CONSTIPATION: ICD-10-CM

## 2018-04-11 DIAGNOSIS — G30.1 LATE ONSET ALZHEIMER'S DISEASE WITHOUT BEHAVIORAL DISTURBANCE (H): ICD-10-CM

## 2018-04-11 DIAGNOSIS — F32.0 MAJOR DEPRESSIVE DISORDER, SINGLE EPISODE, MILD (H): ICD-10-CM

## 2018-04-11 ASSESSMENT — MIFFLIN-ST. JEOR: SCORE: 1107.86

## 2018-04-13 ENCOUNTER — AMBULATORY - HEALTHEAST (OUTPATIENT)
Dept: GERIATRICS | Facility: CLINIC | Age: 83
End: 2018-04-13

## 2018-04-14 ENCOUNTER — DOCUMENTATION ONLY (OUTPATIENT)
Dept: CARE COORDINATION | Facility: CLINIC | Age: 83
End: 2018-04-14

## 2018-04-14 NOTE — PROGRESS NOTES
[facility] now requests orders and shares plan of care/discharge summaries for some patients through [facility].  Please REPLY TO THIS MESSAGE in order to give authorization for orders when needed.  This is considered a verbal order, you will still receive a faxed copy of orders for signature.  Thank you for your assistance in improving collaboration for our patients.    ORDER   PT eval completed 4/14/18.  Requesting orders for ongoing home care PT 2w3 to work on safe transfers and gait, LE strengthening exs, educating [facility] staff on safe mobility.    Requesting order for OT eval and treat for safety with ADLS, equip needs.    PLAN OF CARE   Camille is s/p T12 comp fx due to a fall at home, was at hosp and U, AL back home 4/12/18 with request for home care PT/OT.

## 2018-04-20 ENCOUNTER — TELEPHONE (OUTPATIENT)
Dept: NURSING | Facility: CLINIC | Age: 83
End: 2018-04-20

## 2018-04-20 NOTE — TELEPHONE ENCOUNTER
Talita DODGE at patient's assisted living facility calling to report rectal bleeding.  After large BM today patient had a moderate amount of blood in brief and on draw sheet pad.  Blood bright red and small amount of dark blood.  LPN examined patient rectum and vaginal area, no obvious source of bleeding.  No hemorrhoids noted.  BM was normal brown in color, no mix of blood in stool.  VSS; /83 and HR 82.  Asymptomatic.  Denies dizziness, light-headedness, abdominal pain, or nausea/vomitting.  No anticoagulation therapy.  Advised to continue to monitor and call back ASAP for any symptoms.  Any further advisement?    Talita's # [phone]

## 2018-04-20 NOTE — TELEPHONE ENCOUNTER
I have not seen pt since hospitalized with compression fracture earlier this year. Was then in TCU for quite awhile and apparently just discharged 1 week ago. TCU notes in chart states hx constipation and not on stool softener now. Unclear if still having constipation as large BM could potentially cause rectal fissure tear and BRBPR. No f/u appt with me has been made since pt discharged from TCU. Unable to add on today. If pt having any constipation issues, would have her start Miralax 17g  with large glass of water daily as needed for constipation. Per note below, pt not having other sx.  If no further BRBPR in toilet/on TP, then monitor. If another episode, then pt to be seen in ER. Also needs f/u appt scheduled with me for post hospital/TCU follow-up in the next couple weeks. OK to use future same day appt slot or post hospital f/u slot

## 2018-04-21 DIAGNOSIS — E53.8 VITAMIN B12 DEFICIENCY (NON ANEMIC): ICD-10-CM

## 2018-04-21 NOTE — TELEPHONE ENCOUNTER
"Requested Prescriptions   Pending Prescriptions Disp Refills     cyanocobalamin (VITAMIN B12) 1000 MCG/ML injection [Pharmacy Med Name: CYANOCOBALAMIN 1000MCG/1ML VIAL]  Last Written Prescription Date:  09/21/2017  Last Fill Quantity: 1 mL,  # refills: 11   Last Office Visit: Department has no specialty   Future Office Visit:     10     Sig: INJECT 1ML (1000MCG) IM EVERY MONTH (20TH) (DX:VITAMIN DEFICIENCY)    Vitamin Supplements (Adult) Protocol Passed    4/21/2018 11:23 AM       Passed - High dose Vitamin D not ordered       Passed - Recent (12 mo) or future (30 days) visit within the authorizing provider's specialty    Patient had office visit in the last 12 months or has a visit in the next 30 days with authorizing provider or within the authorizing provider's specialty.  See \"Patient Info\" tab in inbasket, or \"Choose Columns\" in Meds & Orders section of the refill encounter.              "

## 2018-04-23 RX ORDER — CYANOCOBALAMIN 1000 UG/ML
INJECTION, SOLUTION INTRAMUSCULAR; SUBCUTANEOUS
Qty: 1 ML | Refills: 10 | Status: SHIPPED | OUTPATIENT
Start: 2018-04-23

## 2018-04-24 ENCOUNTER — HOSPITAL ENCOUNTER (OUTPATIENT)
Dept: RADIOLOGY | Facility: CLINIC | Age: 83
Discharge: HOME | End: 2018-04-24

## 2018-04-24 ENCOUNTER — OFFICE VISIT - HEALTHEAST (OUTPATIENT)
Dept: NEUROSURGERY | Facility: CLINIC | Age: 83
End: 2018-04-24

## 2018-04-24 DIAGNOSIS — S22.080A T12 COMPRESSION FRACTURE (H): ICD-10-CM

## 2018-04-24 ASSESSMENT — MIFFLIN-ST. JEOR: SCORE: 1105.14

## 2018-04-25 ENCOUNTER — DOCUMENTATION ONLY (OUTPATIENT)
Dept: CARE COORDINATION | Facility: CLINIC | Age: 83
End: 2018-04-25

## 2018-04-25 DIAGNOSIS — M19.91 PRIMARY OSTEOARTHRITIS, UNSPECIFIED SITE: ICD-10-CM

## 2018-04-25 DIAGNOSIS — M81.0 OSTEOPOROSIS: ICD-10-CM

## 2018-04-25 DIAGNOSIS — F03.90 DEMENTIA WITHOUT BEHAVIORAL DISTURBANCE, UNSPECIFIED DEMENTIA TYPE: Primary | ICD-10-CM

## 2018-04-25 DIAGNOSIS — G89.29 CHRONIC NONINTRACTABLE HEADACHE, UNSPECIFIED HEADACHE TYPE: ICD-10-CM

## 2018-04-25 DIAGNOSIS — R51.9 CHRONIC NONINTRACTABLE HEADACHE, UNSPECIFIED HEADACHE TYPE: ICD-10-CM

## 2018-04-25 DIAGNOSIS — E03.9 HYPOTHYROIDISM, UNSPECIFIED TYPE: ICD-10-CM

## 2018-04-25 RX ORDER — LEVOTHYROXINE SODIUM 75 UG/1
TABLET ORAL
Qty: 30 TABLET | Refills: 10 | Status: SHIPPED | OUTPATIENT
Start: 2018-04-25

## 2018-04-25 RX ORDER — GABAPENTIN 100 MG/1
CAPSULE ORAL
Qty: 60 CAPSULE | Refills: 10 | Status: SHIPPED | OUTPATIENT
Start: 2018-04-25

## 2018-04-25 RX ORDER — DONEPEZIL HYDROCHLORIDE 5 MG/1
TABLET, FILM COATED ORAL
Qty: 30 TABLET | Refills: 10 | Status: SHIPPED | OUTPATIENT
Start: 2018-04-25

## 2018-04-25 NOTE — TELEPHONE ENCOUNTER
RX for Donepezil (Aricept) 5 mg sent to pharmacy.   RXs for the other medications sent to [facility].

## 2018-04-25 NOTE — TELEPHONE ENCOUNTER
Per chart pt was in a TCU and home now.  According to TCU notes, pt is on Aricept 5mg q day.     Routed to PCP to advise..  Current med list states 10mg daily.    Routing refill request to provider for review/approval because:  Labs out of range:  TSH       Calcium and Tylenol Prescription approved per [facility] Refill Protocol.

## 2018-04-25 NOTE — TELEPHONE ENCOUNTER
"Requested Prescriptions   Pending Prescriptions Disp Refills     MAPAP 500 MG tablet [Pharmacy Med Name: MAPAP CAPLET 500MG TABLET] 100 tablet 10     Si TABS (1000MG) BY MOUTH THREE TIMES DAILY (DX: PAIN) - NOT TO EXCEED 3000MG APAP/24HRS    Analgesics (Non-Narcotic Tylenol and ASA Only) Passed    2018 11:18 AM       Passed - Recent (12 mo) or future (30 days) visit within the authorizing provider's specialty    Patient had office visit in the last 12 months or has a visit in the next 30 days with authorizing provider or within the authorizing provider's specialty.  See \"Patient Info\" tab in inbasket, or \"Choose Columns\" in Meds & Orders section of the refill encounter.           Passed - Patient is 7 months old or older    If patient is a peds patient of the age 7 mos -12 years, ok to refill using weight-based dosing.     If >3g daily and/or sig is not \"prn\", check for liver enzymes. If normal in the last year, ok to refill.  If not, refer to the provider.          levothyroxine (SYNTHROID/LEVOTHROID) 75 MCG tablet [Pharmacy Med Name: LEVOTHYROXINE SODIUM 75MCG TABLET] 30 tablet 10     Si TAB BY MOUTH DAILY (DX:HYPOTHYROIDISM)    Thyroid Protocol Failed    2018 11:18 AM       Failed - Normal TSH on file in past 12 months    Recent Labs   Lab Test  17   1220   TSH  4.69*             Passed - Patient is 12 years or older       Passed - Recent (12 mo) or future (30 days) visit within the authorizing provider's specialty    Patient had office visit in the last 12 months or has a visit in the next 30 days with authorizing provider or within the authorizing provider's specialty.  See \"Patient Info\" tab in inbasket, or \"Choose Columns\" in Meds & Orders section of the refill encounter.           Passed - No active pregnancy on record    If patient is pregnant or has had a positive pregnancy test, please check TSH.         Passed - No positive pregnancy test in past 12 months    If patient is " "pregnant or has had a positive pregnancy test, please check TSH.          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  See \"Patient Info\" tab in inbasket, or \"Choose Columns\" in Meds & Orders section of the refill encounter.           Passed - Patient is 18 years of age or older        gabapentin (NEURONTIN) 100 MG capsule [Pharmacy Med Name: GABAPENTIN 100MG CAPSULE] 60 capsule 10     Si CAP BY MOUTH TWICE DAILY (DX: PAIN)    There is no refill protocol information for this order        calcium carbonate (OS-ANISA 600 MG [place]. [place]) 1500 (600 Ca) MG tablet [Pharmacy Med Name: CALCIUM CARBONATE 600MG TABLET] 150 tablet 10     Si TAB BY MOUTH TWICE DAILY    Vitamin Supplements (Adult) Protocol Passed    2018 11:18 AM       Passed - High dose Vitamin D not ordered       Passed - Recent (12 mo) or future (30 days) visit within the authorizing provider's specialty    Patient had office visit in the last 12 months or has a visit in the next 30 days with authorizing provider or within the authorizing provider's specialty.  See \"Patient Info\" tab in inbasket, or \"Choose Columns\" in Meds & Orders section of the refill encounter.              "

## 2018-04-25 NOTE — PROGRESS NOTES
[facility] now requests orders and shares plan of care/discharge summaries for some patients through [employer].  Please REPLY TO THIS MESSAGE in order to give authorization for orders when needed.  This is considered a verbal order, you will still receive a faxed copy of orders for signature.  Thank you for your assistance in improving collaboration for our patients.    ORDER    OT 2m1 for caregiver education and wc positioning.  The plan will also include falls prevention plan, monitor and treat pain, monitor skin integrity, continence management, monitor for s/s of depression, and to achieve the following goals.  In 1 month,  1. Pt to participate in wheelchair seating/positioning evaluation to improve posture/promote wound healing/prevent further skin breakdown.  2. pt and son will verbalize understanding re DME needs and resources for inc indep and safety with bathing and toileting.    Keynoa Cintron, OTR/L  [phone]

## 2018-04-26 ENCOUNTER — TELEPHONE (OUTPATIENT)
Dept: INTERNAL MEDICINE | Facility: CLINIC | Age: 83
End: 2018-04-26

## 2018-04-26 NOTE — TELEPHONE ENCOUNTER
Reason for Call:  Same Day Appointment, Requested Provider:  Jeevan Obando MD    PCP: Jeevan Obando    Reason for visit: She is being discharged from TCU following fracture of T12, she also has blood in stool.  TCU would like her to be seen within one week from discharge, which is next week.       Where she is living would like her to see their MD in the facility, but she and her son would like to see Dr. Obando to talk about that transition as well.     Please call Nnamdi at [phone]    Can we leave a detailed message on this number? Yes    Phone number patient can be reached at: Cell number on file:    Telephone Information:   Mobile [phone]       Best Time: Any time    Call taken on 4/26/2018 at 3:53 PM by Anais Villagomez

## 2018-04-26 NOTE — TELEPHONE ENCOUNTER
Care coordination gave contact information for several transportation companies.  Writer called and gave information to patient's son.

## 2018-04-26 NOTE — TELEPHONE ENCOUNTER
Below message incorrect.  Patient was at TCU and recently discharged back to her AL facility.  Patient did have one episode of blood in stool (addressed in telephone encounter from 4/20).  Patient's son Nnamdi wondering if patient can be worked into schedule next week to see Dr. Obando for follow up from TCU.      Per telephone encounter from 4/20 - OK to use future same day appt slot.  Appointment scheduled for 5/1 at 1100.        Nnamdi also requesting contact information for transportation companies with w/c access.  Routing message to care coordination to see if they can assist with this request.

## 2018-05-01 ENCOUNTER — OFFICE VISIT (OUTPATIENT)
Dept: INTERNAL MEDICINE | Facility: CLINIC | Age: 83
End: 2018-05-01
Payer: MEDICARE

## 2018-05-01 VITALS
SYSTOLIC BLOOD PRESSURE: 112 MMHG | HEART RATE: 84 BPM | DIASTOLIC BLOOD PRESSURE: 68 MMHG | BODY MASS INDEX: 29.26 KG/M2 | WEIGHT: 160 LBS | TEMPERATURE: 98.1 F

## 2018-05-01 DIAGNOSIS — F03.90 DEMENTIA WITHOUT BEHAVIORAL DISTURBANCE, UNSPECIFIED DEMENTIA TYPE: ICD-10-CM

## 2018-05-01 DIAGNOSIS — E03.9 HYPOTHYROIDISM, UNSPECIFIED TYPE: Primary | ICD-10-CM

## 2018-05-01 DIAGNOSIS — Z91.81 PERSONAL HISTORY OF FALL: ICD-10-CM

## 2018-05-01 DIAGNOSIS — R26.9 ABNORMAL GAIT: ICD-10-CM

## 2018-05-01 DIAGNOSIS — S22.000D CLOSED COMPRESSION FRACTURE OF THORACIC VERTEBRA WITH ROUTINE HEALING, SUBSEQUENT ENCOUNTER: ICD-10-CM

## 2018-05-01 LAB
ANION GAP SERPL CALCULATED.3IONS-SCNC: 8 MMOL/L (ref 3–14)
BUN SERPL-MCNC: 16 MG/DL (ref 7–30)
CALCIUM SERPL-MCNC: 9.2 MG/DL (ref 8.5–10.1)
CHLORIDE SERPL-SCNC: 107 MMOL/L (ref 94–109)
CO2 SERPL-SCNC: 26 MMOL/L (ref 20–32)
CREAT SERPL-MCNC: 0.83 MG/DL (ref 0.52–1.04)
GFR SERPL CREATININE-BSD FRML MDRD: 65 ML/MIN/1.7M2
GLUCOSE SERPL-MCNC: 99 MG/DL (ref 70–99)
POTASSIUM SERPL-SCNC: 3.9 MMOL/L (ref 3.4–5.3)
SODIUM SERPL-SCNC: 141 MMOL/L (ref 133–144)
TSH SERPL DL<=0.005 MIU/L-ACNC: 3.37 MU/L (ref 0.4–4)

## 2018-05-01 PROCEDURE — 99214 OFFICE O/P EST MOD 30 MIN: CPT | Performed by: INTERNAL MEDICINE

## 2018-05-01 PROCEDURE — 36415 COLL VENOUS BLD VENIPUNCTURE: CPT | Performed by: INTERNAL MEDICINE

## 2018-05-01 PROCEDURE — 80048 BASIC METABOLIC PNL TOTAL CA: CPT | Performed by: INTERNAL MEDICINE

## 2018-05-01 PROCEDURE — 84443 ASSAY THYROID STIM HORMONE: CPT | Performed by: INTERNAL MEDICINE

## 2018-05-01 NOTE — PROGRESS NOTES
SUBJECTIVE:   Camille Vang is a 86 year old female who presents to clinic today for the following health issues:          Hospital Follow-up Visit:    Hospital/Nursing Home/ Rehab Facility: [facility] and [facility]  Date of Admission: 3/05/2018 and 03/12/2018  Date of Discharge: 03/12/2018 and 04/12/2018  Reason(s) for Admission: Back Fracture            Problems taking medications regularly:  None       Medication changes since discharge: None       Problems adhering to non-medication therapy:  None    Summary of hospitalization:  [facility] discharge summary reviewed  See outside records, reviewed and scanned  Diagnostic Tests/Treatments reviewed.  Follow up needed: labs  Other Healthcare Providers Involved in Patient s Care:         PT/)T  Update since discharge: improved.     Post Discharge Medication Reconciliation: discharge medications reconciled, continue medications without change.  Plan of care communicated with patient and son here today     Coding guidelines for this visit:  Type of Medical   Decision Making Face-to-Face Visit       within 7 Days of discharge Face-to-Face Visit        within 14 days of discharge   Moderate Complexity [id number]   High Complexity [id number]          Hospital and TCU notes reviewed.  Part of the summary as below:    CHIEF COMPLAINT / REASON FOR VISIT:  Chief Complaint   Patient presents with     Discharge Summary   [facility] from 03/07/18 until 03/10/18  [facility] TCU from 03/10/18 until 04/12/18    HPI: Camille is an 86 y.o. female with underlying Alzheimer's dementia but without any behavioral disturbance. She had suffered a prior fall and presented with persistent back pain radiating to the abdomen. A CT of the abdomen and pelvis showed a T12 compression fracture with retropulsion. She did not experience any associated neurological deficits.    MRI with moderate T12 compression fracture with 4 mm retropulsion and 50% height loss.  Follow-up thoracic x-ray with TLSO with 70% height loss, severe compression fracture but better retropulsion. Neurosurgery recommended conservative management. Her pain was well-controlled with acetaminophen and no opioids. She has a large number of listed allergies. PT/OT was recommended, and she was transferred here to the TCU. Her donepezil was decreased to avoid further risk of falls. She is to follow-up with neurosurgery in 2 weeks.    Other issues: She has hypothyroidism and is on 75 mcg of levothyroxine daily.    TCU AND DISCHARGE ISSUES    She does have a diagnosis of depression, although she is quite pleasant, rather humorous, and is always smiling broadly when I see her. 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 At one point, she proceeded to go through a home monologue with me at the audience. Dementia is at least moderate. She has scored 2-4/15 on the BIMS assessment since her admission and, while she seems much more oriented than her score would suggest, she is very confused, and I could easily see how she would become hostile.     She does need help in feeding, as she will sit and look at her food without constant coaxing. She has lost a few pounds despite getting Mighty Shake, added on 03/16/18.     She wears a TLSO brace, and her pain is improved now that she is on scheduled acetaminophen. She does require extensive assist for transfer from her bed to her wheelchair but, once she is up, she seems to be ambulating just fine, although she requires assist of one person. She is quite compliant with cares. She has a low bed and floor pad alongside in case she should attempt to get up on her own.     The patient's mobility cannot be sufficiently resolved by the use of a walker or cane due to a recent wedge compression fracture and general difficulty ambulating. She will be discharging to an assisted living facility which provides adequate access between rooms, maneuvering space and  "surfaces for the use of a standard wheelchair. The use of a wheelchair will significantly improve the patient's ability to participate in MRADLs due to mobility limitation, poor balance, and inability to stand without assist. From the wheelchair, the patient is able to participate in ADLs, including dressing, toileting, and transfers. She has 24-hour caregivers that are able to safely use the wheelchair. She will be using the wheelchair daily at home.    She is currently residing at [facility] in [place].    ROS: She denies any current back pain. She denies any shooting pains down the legs. No mention of headaches or chest pains, coughing or congestion, nausea or vomiting, dizziness or dyspnea, dysuria, constipation or diarrhea, difficulty chewing or swallowing, integumentary issues, problems with appetite or sleep.    MEDICATIONS: Reviewed from the MAR, physician orders, and earlier progress notes.  Current Outpatient Prescriptions   Medication Sig     acetaminophen (TYLENOL) 500 MG tablet Take 1,000 mg by mouth 3 (three) times a day.     calcium carbonate (OS-ANISA) 600 mg calcium (1,500 mg) tablet Take 600 mg by mouth 2 (two) times a day with meals.     cyanocobalamin 1,000 mcg/mL injection Inject 1,000 mcg into the shoulder, thigh, or buttocks every 30 (thirty) days. Day 20 of every month.     donepezil (ARICEPT) 10 MG tablet Take 0.5 tablets (5 mg total) by mouth at bedtime.     gabapentin (NEURONTIN) 100 MG capsule Take 100 mg by mouth 2 (two) times a day. At 10:00 and at bedtime.     gauze bandage (KERLIX) 2 1/4 X 3 \"-yard Bndg rebandage hand by wrapping individually around fingers loosely, and then bulky wrapping around hand to wrist twice daily or when soaked through. Disp 14 rolls     levothyroxine (SYNTHROID, LEVOTHROID) 75 MCG tablet Take 75 mcg by mouth daily.     multivit with min-folic acid (ONE-A-DAY VITACRAVES) 200 mcg Chew Chew 1 tablet daily.         ASSESSMENT/Plan:   ICD-10-CM   1. Late " onset Alzheimer's disease without behavioral disturbance G30.1   F02.80   2. T12 compression fracture S22.080A   3. Major depressive disorder, single episode, mild F32.0   4. Chronic constipation K59.09   5. Osteoarthrosis M19.90   6. Hypothyroidism, unspecified type E03.9   7. Osteoporosis M81.0     Patient is discharging to [facility] on 04/12/18.    DISCHARGE PLAN/FACE TO FACE:  I certify that this patient is under my care and that I had a face-to-face encounter that meets the physician face-to-face encounter requirements with this patient.     Date of Face-to-Face Encounter: 04/11/18     I certify that, based on my findings, the following services are medically necessary home health services: Home PT and OT, as well as assist with all mobility.    My clinical findings support the need for the above skilled services because: (Please write a brief narrative summary that describes what the RN, PT, SLP, or other services will be doing in the home. A list of diagnoses in this section does not meet the CMS requirements): Issues for this patient include significant Alzheimer's dementia as well as gait instability when ambulating without assistance. Physical therapy will work with her on this, and occupational therapy will work on cognitive issues in the setting of her current surroundings.    This patient is homebound because: (Please write a brief narrative summmary describing the functional limitations as to why this patient is homebound and specifically what makes this patient homebound.): Alzheimer's dementia and an inability to drive make her homebound.    The patient is, or has been, under my care and I have initiated the establishment of the plan of care. This patient will be followed by a physician who will periodically review the plan of care.    Approximate time spent with this patient was greater than 30 minutes with greater than 50% spent in discussions regarding services required upon  discharge.    The above has been created using voice recognition software. Please be aware that this may unintentionally produce inaccuracies and/or nonsensical sentences.    Electronically signed by: Kemal Ojeda CNP       Pt's past medical history, family history, habits, medications and allergies were reviewed with the patient today.  See snap shot for  HCM status. Most recent lab results reviewed with pt. Problem list and histories reviewed & adjusted, as indicated.  Additional history as below:    Pain control stable with use of Tylenol.  Patient still wearing the  thoracic body brace and has a follow-up appointment soon with neurosurgery for further review.  History is somewhat limited by the patient's dementia but patient denies current exertional chest pain, shortness of breath, abdominal pain.  Son is requesting that the patient have a prescription for a hospital bed in addition to the wheelchair discussed above in the TCU discharge note.  Patient has some difficulty both getting out of bed which requires some assistance for which an adjustable hospital bed would assist  that need and also so that the bed height could be lowered to prevent risk for complications of a fall if patient attempts to get out of bed by herself which has occurred.  This would not be possible with a standard bed height.  At the TCU, patient did have a adjustable  bed but this is not an option at her current assisted living with a standard bed.  Patient does have a cushion floor pad also present along side the bed when sleeping  Due for follow-up labs with regards to hypothyroidism.  Weight stable compared to 5 months ago after previous weight gain.     Additional ROS:   Constitutional, HEENT, Cardiovascular, Pulmonary, GI and , Neuro, MSK and Psych review of systems/symptoms are otherwise negative or unchanged from previous, except as noted above.      OBJECTIVE:  /68  Pulse 84  Temp 98.1  F (36.7  C) (Oral)   "Wt 160 lb (72.6 kg)  BMI 29.26 kg/m2   Estimated body mass index is 29.26 kg/(m^2) as calculated from the following:    Height as of 3/28/16: 5' 2\" (1.575 m).    Weight as of this encounter: 160 lb (72.6 kg).  Eye: PERRL, EOMI  HENT: ear canals and TM's normal and nose and mouth without ulcers or lesions   Neck: no adenopathy. Thyroid normal to palpation. No bruits  Pulm: Lungs clear to auscultation   CV: Regular rates and rhythm  GI: Soft, nontender, Normal active bowel sounds. Not able to assess further due to pt's body brace being worn  Ext: Peripheral pulses intact. No edema.  Neuro: Normal strength and tone, sensory exam grossly normal. 0/3 recall 5 min. Unsteady gait in exam room without assist    Assessment/Plan: (See plan discussion below for further details)  1. Hypothyroidism, unspecified type  Continue levothyroxine.  Lab was ordered  - TSH with free T4 reflex    2. Closed compression fracture of thoracic vertebra with routine healing, subsequent encounter  Continue management per neurosurgery.  Order placed for wheelchair and hospital bed as discussed above.  Need for both is lifetime. Has unknown reaction to Actonel in med chart from previous clinic. No other info re: reaction. Will check BMP. If Actgonel issue only found to be a GI intolerance or similar, possible initiation of Reclast infusion annually  x3 years  - Basic metabolic panel  - order for DME; Equipment being ordered: standard wheelchair  Dispense: 1 Device; Refill: 0  - order for DME; Equipment being ordered: semi electric hospital bed with half bed rails and group 1 mattress  Dispense: 1 Device; Refill: 0    3. Personal history of fall  Continue occupational/physical therapy.  Order placed for wheelchair and hospital bed as discussed above. Need for both pieces of equipment is lifetime.  - order for DME; Equipment being ordered: standard wheelchair  Dispense: 1 Device; Refill: 0  - order for DME; Equipment being ordered: semi electric " hospital bed with half bed rails and group 1 mattress  Dispense: 1 Device; Refill: 0    4. Dementia without behavioral disturbance, unspecified dementia type  Chronic.  On donepezil.  Dose was lowered in the TCU due to concern of fall risk.  Order placed for hospital bed in addition as discussed above  - order for DME; Equipment being ordered: semi electric hospital bed with half bed rails and group 1 mattress  Dispense: 1 Device; Refill: 0    5. Abnormal gait  Continue occupational/physical therapy.  Order placed for wheelchair and hospital bed as discussed above  - order for DME; Equipment being ordered: standard wheelchair  Dispense: 1 Device; Refill: 0  - order for DME; Equipment being ordered: semi electric hospital bed with half bed rails and group 1 mattress  Dispense: 1 Device; Refill: 0      Jeevan Obando MD  Internal Medicine Department  [facility]      Addendum:  With regards to pt's need for hospital bed and wheelchair:   Need for both is 99 years  Hospital bed needs to be electric so that patient can be lowered to the floor for safety reasons.  Electric bed also needed because patient is full assist and this will allow for easier access for staff.

## 2018-05-01 NOTE — MR AVS SNAPSHOT
After Visit Summary   5/1/2018    Camille Vang    MRN: [id number]           Patient Information     Date Of Birth          12/21/1931        Visit Information        Provider Department      5/1/2018 11:00 AM Jeevan Obando MD [facility]        Today's Diagnoses     Hypothyroidism, unspecified type    -  1    Closed compression fracture of thoracic vertebra with routine healing, subsequent encounter        Personal history of fall        Dementia without behavioral disturbance, unspecified dementia type        Abnormal gait           Follow-ups after your visit        Who to contact     If you have questions or need follow up information about today's clinic visit or your schedule please contact [facility] directly at [phone].  Normal or non-critical lab and imaging results will be communicated to you by MyChart, letter or phone within 4 business days after the clinic has received the results. If you do not hear from us within 7 days, please contact the clinic through [employer] or phone. If you have a critical or abnormal lab result, we will notify you by phone as soon as possible.  Submit refill requests through [facility] [facility] or call your pharmacy and they will forward the refill request to us. Please allow 3 business days for your refill to be completed.          Additional Information About Your Visit        MyChart Information     [facility] [facility] gives you secure access to your electronic health record. If you see a primary care provider, you can also send messages to your care team and make appointments. If you have questions, please call your primary care clinic.  If you do not have a primary care provider, please call [phone] and they will assist you.        Care EveryWhere ID     This is your Care EveryWhere ID. This could be used by other organizations to access your [facility] medical records  [id number]        Your Vitals Were     Pulse  Temperature BMI (Body Mass Index)             84 98.1  F (36.7  C) (Oral) 29.26 kg/m2          Blood Pressure from Last 3 Encounters:   05/01/18 112/68   12/01/17 110/62   11/11/16 108/52    Weight from Last 3 Encounters:   05/01/18 160 lb (72.6 kg)   12/01/17 161 lb (73 kg)   11/11/16 146 lb (66.2 kg)              We Performed the Following     Basic metabolic panel     DEPRESSION ACTION PLAN (DAP)     TSH with free T4 reflex          Today's Medication Changes          These changes are accurate as of 5/1/18 11:59 PM.  If you have any questions, ask your nurse or doctor.               Start taking these medicines.        Dose/Directions    order for DME   Used for:  Personal history of fall, Dementia without behavioral disturbance, unspecified dementia type, Abnormal gait, Closed compression fracture of thoracic vertebra with routine healing, subsequent encounter   Started by:  Jeevan Obando MD        Equipment being ordered: semi electric hospital bed with half bed rails and group 1 mattress   Quantity:  1 Device   Refills:  0         These medicines have changed or have updated prescriptions.        Dose/Directions    donepezil 5 MG tablet   Commonly known as:  ARIcept   This may have changed:  Another medication with the same name was removed. Continue taking this medication, and follow the directions you see here.   Used for:  Dementia without behavioral disturbance, unspecified dementia type   Changed by:  Jeevan Obando MD        1 TAB BY MOUTH EVERY BEDTIME ++NOTE STRENGTH++ (DX:DEMENTIA W/O BEHAVIORAL DISTURBANCE)   Quantity:  30 tablet   Refills:  10       order for DME   This may have changed:  additional instructions   Used for:  Closed compression fracture of thoracic vertebra with routine healing, subsequent encounter, Personal history of fall, Abnormal gait   Changed by:  Jeevan Obando MD        Equipment being ordered: standard wheelchair   Quantity:  1 Device   Refills:  0            Where to get  your medicines      Some of these will need a paper prescription and others can be bought over the counter.  Ask your nurse if you have questions.     Bring a paper prescription for each of these medications     order for DME    order for DME                Primary Care Provider Office Phone # Fax #    Jeevan Obando [phone]       [address] [facility] [address]        Equal Access to Services     DARIUS PANIAGUA : Hadii aad ku hadasho Soomaali, waaxda luqadaha, qaybta kaalmada adeegyada, waxay idiin hayaan adeeg khjohnsh laGuerrerorhiannonn ah. So [facility] [phone].    ATENCIÓN: Si habla español, tiene a van disposición servicios gratuitos de asistencia lingüística. Llame al [phone].    We comply with applicable federal civil rights laws and [place] laws. We do not discriminate on the basis of race, color, national origin, age, disability, sex, sexual orientation, or gender identity.            Thank you!     Thank you for choosing [facility]  for your care. Our goal is always to provide you with excellent care. Hearing back from our patients is one way we can continue to improve our services. Please take a few minutes to complete the written survey that you may receive in the mail after your visit with us. Thank you!             Your Updated Medication List - Protect others around you: Learn how to safely use, store and throw away your medicines at www.disposemymeds.org.          This list is accurate as of 5/1/18 11:59 PM.  Always use your most recent med list.                   Brand Name Dispense Instructions for use Diagnosis    calcium carbonate 1500 (600 Ca) MG tablet    OS-ANISA 600 mg [place]. Ca    180 tablet    1 TAB BY MOUTH TWICE DAILY    Osteoporosis       cyanocobalamin 1000 MCG/ML injection    VITAMIN B12    1 mL    INJECT 1ML (1000MCG) IM EVERY MONTH (20TH) (DX:VITAMIN DEFICIENCY)    Vitamin B12 deficiency (non anemic)       donepezil 5 MG tablet    ARIcept    30  tablet    1 TAB BY MOUTH EVERY BEDTIME ++NOTE STRENGTH++ (DX:DEMENTIA W/O BEHAVIORAL DISTURBANCE)    Dementia without behavioral disturbance, unspecified dementia type       gabapentin 100 MG capsule    NEURONTIN    60 capsule    1 CAP BY MOUTH TWICE DAILY (DX: PAIN)    Chronic nonintractable headache, unspecified headache type       levothyroxine 75 MCG tablet    SYNTHROID/LEVOTHROID    30 tablet    1 TAB BY MOUTH DAILY (DX:HYPOTHYROIDISM)    Hypothyroidism, unspecified type       MAPAP 500 MG tablet   Generic drug:  acetaminophen     100 tablet    2 TABS (1000MG) BY MOUTH THREE TIMES DAILY (DX: PAIN) - NOT TO EXCEED 3000MG APAP/24HRS    Primary osteoarthritis, unspecified site       order for DME     1 Device    Equipment being ordered: standard wheelchair    Closed compression fracture of thoracic vertebra with routine healing, subsequent encounter, Personal history of fall, Abnormal gait       order for DME     1 Device    Equipment being ordered: semi electric hospital bed with half bed rails and group 1 mattress    Personal history of fall, Dementia without behavioral disturbance, unspecified dementia type, Abnormal gait, Closed compression fracture of thoracic vertebra with routine healing, subsequent encounter       RA ONE DAILY GUMMY VITES Chew     70 tablet    Take 1 chew tab by mouth daily    Vitamin deficiency

## 2018-05-01 NOTE — LETTER
My Depression Action Plan  Name: Camille Vang   Date of Birth 12/21/1931  Date: 5/1/2018    My doctor: Jeevan Obando   My clinic: [address] [address] [phone]          GREEN    ZONE   Good Control    What it looks like:     Things are going generally well. You have normal up s and down s. You may even feel depressed from time to time, but bad moods usually last less than a day.   What you need to do:  1. Continue to care for yourself (see self care plan)  2. Check your depression survival kit and update it as needed  3. Follow your physician s recommendations including any medication.  4. Do not stop taking medication unless you consult with your physician first.           YELLOW         ZONE Getting Worse    What it looks like:     Depression is starting to interfere with your life.     It may be hard to get out of bed; you may be starting to isolate yourself from others.    Symptoms of depression are starting to last most all day and this has happened for several days.     You may have suicidal thoughts but they are not constant.   What you need to do:     1. Call your care team, your response to treatment will improve if you keep your care team informed of your progress. Yellow periods are signs an adjustment may need to be made.     2. Continue your self-care, even if you have to fake it!    3. Talk to someone in your support network    4. Open up your depression survival kit           RED    ZONE Medical Alert - Get Help    What it looks like:     Depression is seriously interfering with your life.     You may experience these or other symptoms: You can t get out of bed most days, can t work or engage in other necessary activities, you have trouble taking care of basic hygiene, or basic responsibilities, thoughts of suicide or death that will not go away, self-injurious behavior.     What you need to do:  1. Call your care  team and request a same-day appointment. If they are not available (weekends or after hours) call your local crisis line, emergency room or 911.            Depression Self Care Plan / Survival Kit    Self-Care for Depression  Here s the deal. Your body and mind are really not as separate as most people think.  What you do and think affects how you feel and how you feel influences what you do and think. This means if you do things that people who feel good do, it will help you feel better.  Sometimes this is all it takes.  There is also a place for medication and therapy depending on how severe your depression is, so be sure to consult with your medical provider and/ or Behavioral Health Consultant if your symptoms are worsening or not improving.     In order to better manage my stress, I will:    Exercise  Get some form of exercise, every day. This will help reduce pain and release endorphins, the  feel good  chemicals in your brain. This is almost as good as taking antidepressants!  This is not the same as joining a gym and then never going! (they count on that by the way ) It can be as simple as just going for a walk or doing some gardening, anything that will get you moving.      Hygiene   Maintain good hygiene (Get out of bed in the morning, Make your bed, Brush your teeth, Take a shower, and Get dressed like you were going to work, even if you are unemployed).  If your clothes don't fit try to get ones that do.    Diet  I will strive to eat foods that are good for me, drink plenty of water, and avoid excessive sugar, caffeine, alcohol, and other mood-altering substances.  Some foods that are helpful in depression are: complex carbohydrates, B vitamins, flaxseed, fish or fish oil, fresh fruits and vegetables.    Psychotherapy  I agree to participate in Individual Therapy (if recommended).    Medication  If prescribed medications, I agree to take them.  Missing doses can result in serious side effects.  I  understand that drinking alcohol, or other illicit drug use, may cause potential side effects.  I will not stop my medication abruptly without first discussing it with my provider.    Staying Connected With Others  I will stay in touch with my friends, family members, and my primary care provider/team.    Use your imagination  Be creative.  We all have a creative side; it doesn t matter if it s oil painting, sand castles, or mud pies! This will also kick up the endorphins.    Witness Beauty  (AKA stop and smell the roses) Take a look outside, even in mid-winter. Notice colors, textures. Watch the squirrels and birds.     Service to others  Be of service to others.  There is always someone else in need.  By helping others we can  get out of ourselves  and remember the really important things.  This also provides opportunities for practicing all the other parts of the program.    Humor  Laugh and be silly!  Adjust your TV habits for less news and crime-drama and more comedy.    Control your stress  Try breathing deep, massage therapy, biofeedback, and meditation. Find time to relax each day.     My support system    Clinic Contact:  Phone number:    Contact 1:  Phone number:    Contact 2:  Phone number:    [facility]/:  Phone number:    Therapist:  Phone number:    Local crisis center:    Phone number:    Other community support:  Phone number:

## 2018-05-02 ASSESSMENT — PATIENT HEALTH QUESTIONNAIRE - PHQ9: SUM OF ALL RESPONSES TO PHQ QUESTIONS 1-9: 2

## 2018-05-03 ENCOUNTER — DOCUMENTATION ONLY (OUTPATIENT)
Dept: CARE COORDINATION | Facility: CLINIC | Age: 83
End: 2018-05-03

## 2018-05-03 NOTE — PROGRESS NOTES
[facility] now requests orders and shares plan of care/discharge summaries for some patients through [employer].  Please REPLY TO THIS MESSAGE in order to give authorization for orders when needed.  This is considered a verbal order, you will still receive a faxed copy of orders for signature.  Thank you for your assistance in improving collaboration for our patients.    ORDER Continued home PT 2w3, 1w1 to focus on [facility] staff adherence to safety and precautions, progressing LE strength, transfer training, initiating gait training if appropriate, and fall prevention.     Rama Mueller, PT  [email or web address]  [phone]

## 2018-05-08 ENCOUNTER — DOCUMENTATION ONLY (OUTPATIENT)
Dept: CARE COORDINATION | Facility: CLINIC | Age: 83
End: 2018-05-08

## 2018-05-08 DIAGNOSIS — F03.90 DEMENTIA WITHOUT BEHAVIORAL DISTURBANCE, UNSPECIFIED DEMENTIA TYPE: ICD-10-CM

## 2018-05-08 DIAGNOSIS — S22.000G CLOSED COMPRESSION FRACTURE OF THORACIC VERTEBRA WITH DELAYED HEALING, SUBSEQUENT ENCOUNTER: Primary | ICD-10-CM

## 2018-05-08 NOTE — PROGRESS NOTES
Pt is in need of hospital bed and mattress. Please send order for semi electric hospital bed with half bed rails and group 1 mattress along with facesheet to [facility] fax number [phone].     Thank you,  Keyona Cintron, OTR/L  [phone]

## 2018-05-09 PROBLEM — S22.000G: Status: ACTIVE | Noted: 2018-05-09

## 2018-05-09 NOTE — PROGRESS NOTES
Order printed and in [place] basket. Please fax to [facility] as requested with face sheet

## 2018-05-24 ENCOUNTER — TELEPHONE (OUTPATIENT)
Dept: INTERNAL MEDICINE | Facility: CLINIC | Age: 83
End: 2018-05-24

## 2018-05-24 NOTE — TELEPHONE ENCOUNTER
Patients pamela Coto calling saying that [facility] is still waiting for office notes pertaining to hospital bed and wheelchair and a dme order for the wheelchair to be faxed over.   He said that both the bed and wheel chair were discussed at the 5/01/2018 office visit with patient and son. Please fax to Alli at [phone] and call pamela Coto at [phone] when this has been done.

## 2018-05-30 ENCOUNTER — DOCUMENTATION ONLY (OUTPATIENT)
Dept: CARE COORDINATION | Facility: CLINIC | Age: 83
End: 2018-05-30

## 2018-05-30 NOTE — PROGRESS NOTES
[facility] now requests orders and shares plan of care/discharge summaries for some patients through [employer].  Please REPLY TO THIS MESSAGE OR ROUTE BACK TO THE AUTHOR in order to give authorization for orders when needed.  This is considered a verbal order, you will still receive a faxed copy of orders for signature.  Thank you for your assistance in improving collaboration for our patients.    ORDER Continued home PT 1w1, 2w1, 1w1 for LE strengthening, transfer training, gait training, and fall prevention. Patient has progressed to achieving sit to stand transfers with moderate assist/cues and ambulation 15 ft with 4WW.    Rama Mueller, PT  [email or web address]  [phone]

## 2018-06-04 NOTE — TELEPHONE ENCOUNTER
I apologize for long delay. Clinic note and Rx for Wheelchair and hospital bed all in [facility]. Please fax  to Alli and  inform pt's son

## 2018-06-05 RX ORDER — BISMUTH SUBSALICYLATE 525 MG/15ML
SUSPENSION ORAL
Refills: 10 | OUTPATIENT
Start: 2018-06-05

## 2018-06-11 ENCOUNTER — DOCUMENTATION ONLY (OUTPATIENT)
Dept: CARE COORDINATION | Facility: CLINIC | Age: 83
End: 2018-06-11

## 2018-06-11 NOTE — PROGRESS NOTES
[facility] now requests orders and shares plan of care/discharge summaries for some patients through [employer].  Please REPLY TO THIS MESSAGE OR ROUTE BACK TO THE AUTHOR in order to give authorization for orders when needed.  This is considered a verbal order, you will still receive a faxed copy of orders for signature.  Thank you for your assistance in improving collaboration for our patients.    ORDER Completed home care recertification 6/11/18. Requesting continued skilled PT 1w1, 2w2, 1w2 through week of 7/9/18 to focus on transfer training, bed mobility, gait training, and fall prevention. Pt has progressed to ambulation 55 ft with 4WW and minimal assist, and performs transfers with minimal to moderate assist.     Rama Mueller, PT  [email or web address]  [phone]

## 2018-06-12 ENCOUNTER — OFFICE VISIT - HEALTHEAST (OUTPATIENT)
Dept: NEUROSURGERY | Facility: CLINIC | Age: 83
End: 2018-06-12

## 2018-06-12 ENCOUNTER — HOSPITAL ENCOUNTER (OUTPATIENT)
Dept: RADIOLOGY | Facility: CLINIC | Age: 83
Discharge: HOME | End: 2018-06-12

## 2018-06-12 DIAGNOSIS — S22.080A T12 COMPRESSION FRACTURE (H): ICD-10-CM
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
## 2018-06-18 ENCOUNTER — TRANSFERRED RECORDS (OUTPATIENT)
Dept: HEALTH INFORMATION MANAGEMENT | Facility: CLINIC | Age: 83
End: 2018-06-18

## 2018-06-22 NOTE — TELEPHONE ENCOUNTER
Son called back stating that [facility] needs more detailed info for the reason why patient is needing the hospital bed, as well as the length of need for the wheelchair with he states will be for a lifetime.

## 2018-06-23 ENCOUNTER — HEALTH MAINTENANCE LETTER (OUTPATIENT)
Age: 83
End: 2018-06-23

## 2018-07-09 ENCOUNTER — DOCUMENTATION ONLY (OUTPATIENT)
Dept: CARE COORDINATION | Facility: CLINIC | Age: 83
End: 2018-07-09

## 2018-07-09 NOTE — PROGRESS NOTES
[facility] now requests orders and shares plan of care/discharge summaries for some patients through [employer].  Please REPLY TO THIS MESSAGE OR ROUTE BACK TO THE AUTHOR in order to give authorization for orders when needed.  This is considered a verbal order, you will still receive a faxed copy of orders for signature.  Thank you for your assistance in improving collaboration for our patients.    ORDER Continued home PT 1w1, 2w2, 1w1 for assessing for use of EZ stand/Denys lift with staff for improved safety with transfers, and setting up ambulation program with staff.     Rama Mueller, PT  [email or web address]  [phone]

## 2018-07-19 NOTE — TELEPHONE ENCOUNTER
From 5/1/18, note needs to be changed to include:  For both bed and wheel chair--Duration of need=99 years  For hospital bed, reason for electric bed--to lower to the floor for her safety, and full assistance to get in and out of bed--easier for staff.  [phone], TALYA Manuel and son. Call him for more info or questions.  Please change the original form that was fax and refax to [facility].  has changed.

## 2018-08-03 ENCOUNTER — TELEPHONE (OUTPATIENT)
Dept: NURSING | Facility: CLINIC | Age: 83
End: 2018-08-03

## 2018-08-03 NOTE — TELEPHONE ENCOUNTER
"Patient's son calling stating [facility] and medicare need recently faxed forms changed to state that bed and wheelchair are needed for \"99 years.\"  Form must also state that hospital bed needs to be electric so that patient can lower to the floor for safety reasons.  Electric bed also needed because patient is full assist and this will allow for easier access for staff.  Patient's son states that we should have fax number for [facility] contact.  He would also like a nurse to call him back once this has been taken care of.  Routing to PCP.  "

## 2018-08-08 NOTE — TELEPHONE ENCOUNTER
See message below. Paperwork in [place] basket to be  faxed to [facility], Attn: Jayy Mary [phone]

## 2018-08-08 NOTE — TELEPHONE ENCOUNTER
" I have modified pt's clinic visit note again with a separate addendum. I used the specific words as below that pt's son requested as being necessary for documentation. Printed copy of clinic visit again and this is in [facility]. Please fax paperwork again with the updated clinic visit note.  May also inform pt's sone of the addendum added. I have copied the addendum below:    Addendum:  \"With regards to pt's need for hospital bed and wheelchair:   Need for both is 99 years  Hospital bed needs to be electric so that patient can be lowered to the floor for safety reasons.  Electric bed also needed because patient is full assist and this will allow for easier access for staff.  "

## 2018-08-09 ENCOUNTER — MYC MEDICAL ADVICE (OUTPATIENT)
Dept: INTERNAL MEDICINE | Facility: CLINIC | Age: 83
End: 2018-08-09

## 2018-08-09 DIAGNOSIS — Z91.81 PERSONAL HISTORY OF FALL: ICD-10-CM

## 2018-08-09 DIAGNOSIS — R26.9 ABNORMAL GAIT: ICD-10-CM

## 2018-08-09 DIAGNOSIS — F03.90 DEMENTIA WITHOUT BEHAVIORAL DISTURBANCE, UNSPECIFIED DEMENTIA TYPE: ICD-10-CM

## 2018-08-09 DIAGNOSIS — S22.000D CLOSED COMPRESSION FRACTURE OF THORACIC VERTEBRA WITH ROUTINE HEALING, SUBSEQUENT ENCOUNTER: ICD-10-CM

## 2018-08-09 NOTE — TELEPHONE ENCOUNTER
Requesting updated DME order for electric bed.  Need length of time and clarification of type of bed in office visit note vs. DME order.      Office visit note and DME order to be faxed   [phone]   ATTN: Deanne

## 2018-08-15 ENCOUNTER — MEDICAL CORRESPONDENCE (OUTPATIENT)
Dept: HEALTH INFORMATION MANAGEMENT | Facility: CLINIC | Age: 83
End: 2018-08-15

## 2018-08-15 NOTE — TELEPHONE ENCOUNTER
See tele message 8/3/18 with last time everything was sent to [facility]. Per son's MC message below, in order to qualify for the semi-electric bed, pt would need to also have issues with needing to be turned frequently in bed such as for skin breakdown issues and documentation of how often and documentation of skin findings on exam. That is not applicable as pt has not had those issues or those needs. Therefore I cannot  Do anything further than reasons already documented which are apparently not enough for Medicare criteria. Spoke with pt's son and suggested that he either rent bed or look into costs of purchase and to discuss with SW at facility where pt is residing currently for assistance re: this. Son states facility is currently providing this type of bed  From the memory unit for pt to use

## 2018-08-20 ENCOUNTER — MEDICAL CORRESPONDENCE (OUTPATIENT)
Dept: HEALTH INFORMATION MANAGEMENT | Facility: CLINIC | Age: 83
End: 2018-08-20

## 2018-08-20 ENCOUNTER — TRANSFERRED RECORDS (OUTPATIENT)
Dept: HEALTH INFORMATION MANAGEMENT | Facility: CLINIC | Age: 83
End: 2018-08-20

## 2018-08-21 ENCOUNTER — TRANSFERRED RECORDS (OUTPATIENT)
Dept: HEALTH INFORMATION MANAGEMENT | Facility: CLINIC | Age: 83
End: 2018-08-21

## 2018-09-12 ENCOUNTER — MEDICAL CORRESPONDENCE (OUTPATIENT)
Dept: HEALTH INFORMATION MANAGEMENT | Facility: CLINIC | Age: 83
End: 2018-09-12

## 2018-10-04 DIAGNOSIS — E56.9 VITAMIN DEFICIENCY: ICD-10-CM

## 2018-10-11 ENCOUNTER — TELEPHONE (OUTPATIENT)
Dept: INTERNAL MEDICINE | Facility: CLINIC | Age: 83
End: 2018-10-11
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
## 2018-10-12 ENCOUNTER — OFFICE VISIT (OUTPATIENT)
Dept: INTERNAL MEDICINE | Facility: CLINIC | Age: 83
End: 2018-10-12
Payer: MEDICARE

## 2018-10-12 VITALS
BODY MASS INDEX: 29.26 KG/M2 | OXYGEN SATURATION: 96 % | WEIGHT: 160 LBS | HEART RATE: 80 BPM | SYSTOLIC BLOOD PRESSURE: 104 MMHG | DIASTOLIC BLOOD PRESSURE: 44 MMHG

## 2018-10-12 DIAGNOSIS — T24.201A PARTIAL THICKNESS BURN OF RIGHT LOWER EXTREMITY, INITIAL ENCOUNTER: Primary | ICD-10-CM

## 2018-10-12 PROCEDURE — 99214 OFFICE O/P EST MOD 30 MIN: CPT | Performed by: PHYSICIAN ASSISTANT

## 2018-10-12 RX ORDER — CEPHALEXIN 500 MG/1
500 CAPSULE ORAL 3 TIMES DAILY
Qty: 30 CAPSULE | Refills: 0 | Status: SHIPPED | OUTPATIENT
Start: 2018-10-12 | End: 2018-10-22
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
## 2018-10-12 NOTE — PATIENT INSTRUCTIONS
Prescription sent to Pharmacy     Recheck if not improving with antibiotic course and dressing change in 10 days.

## 2019-03-07 ENCOUNTER — DOCUMENTATION ONLY (OUTPATIENT)
Dept: OTHER | Facility: CLINIC | Age: 84
End: 2019-03-07

## 2019-03-07 ENCOUNTER — AMBULATORY - HEALTHEAST (OUTPATIENT)
Dept: OTHER | Facility: CLINIC | Age: 84
End: 2019-03-07

## 2019-03-24 ENCOUNTER — COMMUNICATION - HEALTHEAST (OUTPATIENT)
Dept: SCHEDULING | Facility: CLINIC | Age: 84
End: 2019-03-24

## 2019-10-01 ENCOUNTER — HEALTH MAINTENANCE LETTER (OUTPATIENT)
Age: 84
End: 2019-10-01

## 2019-12-15 ENCOUNTER — HEALTH MAINTENANCE LETTER (OUTPATIENT)
Age: 84
End: 2019-12-15

## 2019-12-24 NOTE — TELEPHONE ENCOUNTER
Contacted Talita and gave her the information below regarding the Tamiflu.     OCTAVIO Rajput ([facility])    
Talita, nurse from [facility], calling to request medication.  There has been a Influenza break-out at facility and may residents are taking prophylactic Tamiflu.  Patient is not showing any signs of illness at this time.  Would PCP like to order this for patient?  If so please send prescription to [facility] pharmacy.  Thank you.  
Tamiflu, 1 capsule daily for 10 days. Rx faxed to [facility] pharmacy. Inform  Senior Living facility nurse  
yes...

## 2020-07-20 NOTE — TELEPHONE ENCOUNTER
"Patient was previously on Actonel for at least intermittently from [id number] and then was discontinued for unclear reasons.  Gynecology note from June 2007 states the patient was intolerant of Actonel, Fosamax and Evista \"for variable reasons\" no specifics are listed in the chart.  Patient has.  Actonel and Fosamax listed under medication allergies but there is no mention what the problem was with the medication.  Chart shows the patient had been prescribed Boniva and Forteo but did not fill either of those prescriptions.  This all occurred prior to me meeting the patient  For the first time and info obtauned  Through chart review where previous physicians did not document things as well. whether medication could be used orally or needing IV would depend on patient swallowing status and other issues.  Therefore patient will need to see me back in clinic for further evaluation before I could make recommendations regarding what would be best to reduce risk for future fracture  " The physician has signed the paperwork and it has been faxed back to the Forms Completion Department.

## 2021-01-15 ENCOUNTER — HEALTH MAINTENANCE LETTER (OUTPATIENT)
Age: 86
End: 2021-01-15

## 2021-05-27 NOTE — TELEPHONE ENCOUNTER
"RN Assessment/Reason for Call:   Okay to leave Detailed Message  Son calling in, mother was admitted to [facility] Feb. for a week. Fluid between spine and esophagus.Trouble swallowing.  Mass in lung.  3/20 assisted living called;\" hot leg\" US showed blood clots.  Throat is swollen.    [facility] called on Fri, they think she is ready for hospice.  He saw her last night. Today hospice said \"Camille is actively dying\".  Fever 101. They just want to administer comfort care.   Should he bring her in to get treatment?   Friend feels she should be treated.  Has Alzheimers.  Doesn't look like the natural dying process.    RN Action/Disposition:  No Protocol; recommend he call her PCP for guidance and review her advance directive.    Kelly Ovalles RN    Care Connection Triage/med refill  3/24/2019  4:39 PM      "

## 2021-06-01 VITALS — WEIGHT: 155.6 LBS | HEIGHT: 64 IN | BODY MASS INDEX: 26.56 KG/M2

## 2021-06-01 VITALS — HEIGHT: 64 IN | BODY MASS INDEX: 26.46 KG/M2 | WEIGHT: 155 LBS

## 2021-06-01 VITALS — HEIGHT: 64 IN | WEIGHT: 157.2 LBS | BODY MASS INDEX: 26.84 KG/M2

## 2021-06-01 VITALS — WEIGHT: 159.4 LBS | BODY MASS INDEX: 27.21 KG/M2 | HEIGHT: 64 IN

## 2021-06-01 VITALS — HEIGHT: 64 IN | BODY MASS INDEX: 27.41 KG/M2

## 2021-06-01 VITALS — WEIGHT: 157 LBS | HEIGHT: 64 IN | BODY MASS INDEX: 26.8 KG/M2

## 2021-06-01 VITALS — BODY MASS INDEX: 27.83 KG/M2 | WEIGHT: 163 LBS | HEIGHT: 64 IN

## 2021-06-16 NOTE — PROGRESS NOTES
[facility]      Facility:    [facility] [[id number]]    Code Status: FULL CODE   [facility]  3/7 -3/10/2018      Chief Complaint/Reason for Visit:  Chief Complaint   Patient presents with     H & P     fall w T12 compression fracture       HPI:   Camille is a 86 y.o. female with known Alzheimer's dementia who fell in her facility.  She had persistent back pain radiating forward to the abdomen.    She was admitted to [facility] where she had a CT showing a T12 compression fracture with retropulsion of fragments.  There were no neuro deficits.  MRI showed moderate T12 compression fracture with a 4-5 mm retropulsion and 50% height loss.  She was placed in a TLSO.  Repeat thoracic spine film 2 days later showed a 70% height loss, severe compression fracture but less retropulsion.  Neurosurgery recommended conservative management.  Opioids were not used, she seemed to be fairly comfortable with acetaminophen in the hospital.  She was on donepezil for her dementia, but it was decreased as it may be a contributor to her falls.    Incidentally found on imaging:  a nonobstructing stone in the left lower kidney pole.  She had benign-appearing cysts one each of her kidneys.      Other medical issues:   - frequent falls  - Osteoporosis  - hypothyroidism, on levothyroxine  - Macrocytosis with a history of vitamin B12 deficiency, on supplementation    Past Medical History:  Past Medical History:   Diagnosis Date     B12 deficiency 6/2/2015     Chronic constipation      Dementia without behavioral disturbance, unspecified dementia type 11/11/2015     Hypercholesteremia      Hypothyroidism      Major depressive disorder, single episode, mild 4/3/2016     Osteoarthrosis      Osteoporosis 11/5/2002    Overview:  Problem list name updated by automated process. Provider to review           Surgical History:  Past Surgical History:   Procedure Laterality Date     APPENDECTOMY        CATARACT EXTRACTION, BILATERAL       CERVIX LESION DESTRUCTION       TONSILLECTOMY AND ADENOIDECTOMY         Family History:   Family History   Problem Relation Age of Onset     Brain cancer Mother      Diabetes type II Mother      Stroke Father      Heart disease Brother      Diabetes type II Brother      Dementia Brother      NPH     Dementia Brother      NPH     Leukemia Brother      Cancer Brother      bladder       Social History:    Social History     Social History     Marital status:      Spouse name: N/A     Number of children: 4     Years of education: N/A     Social History Main Topics     Smoking status: Never Smoker     Smokeless tobacco: Never Used     Alcohol use No     Drug use: No     Sexual activity: Not on file     Other Topics Concern     Not on file     Social History Narrative    2 children have           Review of Systems   Demonstrated a lot of pain verbalizations when she was working with therapy to go from laying to sitting, even wearing her TLSO  Otherwise because of her advanced dementia cannot really give much meaningful full history.  Cannot remember if she has been having bowel movements    Vitals:    18 0900   BP: 117/75   Pulse: 72   Resp: 16   Temp: (!) 96.1  F (35.6  C)   SpO2: 98%       Physical Exam   Constitutional:   Oriented to self, talkative.  Demonstrates pain with movement of her low back   HENT:   Oropharynx is dry   Eyes: Conjunctivae and EOM are normal. No scleral icterus.   Cardiovascular: Regular rhythm and normal heart sounds.    No murmur heard.  Pulmonary/Chest: Breath sounds normal. She has no wheezes. She has no rales.   Abdominal: Soft. Bowel sounds are normal. She exhibits no distension.   Unable to palpate due to her TLSO being in place   Musculoskeletal:   TLSO in place.  Needing assisted to get up from laying with a TLSO to sitting at the bedside, heavy assist of 1 to stand up a walker  Decreased range of motion, all extremities  Low muscle  "mass, weakness symmetric   Lymphadenopathy:     She has no cervical adenopathy.   Neurological: She is alert. Coordination normal.   Pretty profound dementia, she chatters but little reality based content, her speech flows in and out of past and present  Oriented to self only   Skin: Skin is warm and dry. No rash noted.   Psychiatric: She has a normal mood and affect.       Medication List:  Current Outpatient Prescriptions   Medication Sig     acetaminophen (TYLENOL) 500 MG tablet Take 1,000 mg by mouth 3 (three) times a day.     calcium carbonate (OS-ANISA) 600 mg calcium (1,500 mg) tablet Take 600 mg by mouth 2 (two) times a day with meals.     cyanocobalamin 1,000 mcg/mL injection Inject 1,000 mcg into the shoulder, thigh, or buttocks every 30 (thirty) days. Day 20 of every month.     donepezil (ARICEPT) 10 MG tablet Take 0.5 tablets (5 mg total) by mouth at bedtime.     gabapentin (NEURONTIN) 100 MG capsule Take 100 mg by mouth 2 (two) times a day. At 10:00 and at bedtime.     gauze bandage (KERLIX) 2 1/4 X 3 \"-yard Bndg rebandage hand by wrapping individually around fingers loosely, and then bulky wrapping around hand to wrist twice daily or when soaked through. Disp 14 rolls     levothyroxine (SYNTHROID, LEVOTHROID) 75 MCG tablet Take 75 mcg by mouth daily.     multivit with min-folic acid (ONE-A-DAY VITACRAVES) 200 mcg Chew Chew 1 tablet daily.       Labs:     Ref Range & Units 3/7/18  5:01 PM     WBC 4.0 - 11.0 thou/uL 9.1   Hemoglobin 12.0 - 16.0 g/dL 14.3   Platelets 140 - 440 thou/uL 200   Neutrophils % 50 - 70 % 79 (H)   Lymphocytes % 20 - 40 % 12 (L)        Ref Range & Units 3/7/18  5:01 PM     Sodium 136 - 145 mmol/L 139   Potassium 3.5 - 5.0 mmol/L 4.1   Chloride 98 - 107 mmol/L 101   CO2 22 - 31 mmol/L 27   Anion Gap, Calculation 5 - 18 mmol/L 11   Glucose 70 - 125 mg/dL 116   BUN 8 - 28 mg/dL 26   Creatinine 0.60 - 1.10 mg/dL 0.82   GFR MDRD Non Af Amer >60 mL/min/1.73m2 >60   Bilirubin, Total 0.0 - " 1.0 mg/dL 0.7   Calcium 8.5 - 10.5 mg/dL 10.1   Protein, Total 6.0 - 8.0 g/dL 8.0   Albumin 3.5 - 5.0 g/dL 3.6   Alkaline Phosphatase 45 - 120 U/L 81   AST 0 - 40 U/L 26   ALT 0 - 45 U/L 17           Assessment:    ICD-10-CM    1. T12 compression fracture S22.080A    2. Osteoporosis M81.0    3. Late onset Alzheimer's disease without behavioral disturbance G30.1     F02.80    4. Fall, sequela W19.XXXS    5. Hypothyroidism, unspecified type E03.9    6. Osteoarthrosis M19.90          Plan:  Therapies and social service will have to assess for proper placement.  In is okay at rest, using Tylenol.  If her pain prevents participation in therapy, we can use very small doses of the week narcotic prior to therapies.      Electronically signed by: Amanda Moran MD

## 2021-06-16 NOTE — PROGRESS NOTES
"[facility]    Name:   Camille Vang  : 1931  Facility:   [facility] [[id number]]   Room: TCU3-[facility]  Code Status: FULL CODE -   Fac type:   SNF (Skilled Nursing Facility, TCU) -     CHIEF COMPLAINT / REASON FOR VISIT:  Chief Complaint   Patient presents with     Review Of Multiple Medical Conditions     TCU follow-up after hospitalization for a T12 compression fracture.    Patient was seen by Dr. Moran for an admission visit on 18 and subsequently seen on 18 in follow-up.    HPI: Camille is an 86 y.o. female with underlying Alzheimer's dementia but without any behavioral disturbance.  She had suffered a prior fall and presented with persistent back pain radiating to the abdomen.  A CT of the abdomen and pelvis showed a T12 compression fracture with retropulsion.  She did not experience any associated neurological deficits.    MRI with moderate T12 compression fracture with 4 mm retropulsion and 50% height loss.  Follow-up thoracic x-ray with TLSO with 70% height loss, severe compression fracture but better retropulsion.  Neurosurgery recommended conservative management.  Her pain was well-controlled with acetaminophen and no opioids.  She has a large number of listed allergies.  PT/OT was recommended, and she was transferred here to the TCU.  Her donepezil was decreased to avoid further risk of falls.  She is to follow-up with neurosurgery in 2 weeks.    Other issues: She has hypothyroidism and is on 75 mcg of levothyroxine daily.      CURRENT ISSUES    She does have a diagnosis of depression, although she is quite pleasant and rather humorous.  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  When last seen, while she could not tell me the year (she told me, \"no, do not do these things to me.  I cannot bear it!\") or even what year she was born, she could recite her lines from a play by heart.  She scored only 3/15 on a recent BIMS " assessment.    She does find her TLSO mildly uncomfortable, but her pain is improved now that she is on scheduled acetaminophen.  She does require extensive assist for transfer from her bed to her wheelchair but, once she is up, she seems to be ambulating just fine.  She is quite compliant with cares.    She is currently residing at [facility] in [place].    ROS: She does tell me that her back still hurts, but it is improving.  She denies any shooting pains down the legs.  No mention of headaches or chest pains, coughing or congestion, nausea or vomiting, dizziness or dyspnea, dysuria, constipation or diarrhea, difficulty chewing or swallowing, integumentary issues, problems with appetite or sleep.    Past Medical History:   Diagnosis Date     B12 deficiency 2015     Chronic constipation      Dementia without behavioral disturbance, unspecified dementia type 2015     Hypercholesteremia      Hypothyroidism      Major depressive disorder, single episode, mild 4/3/2016     Osteoarthrosis      Osteoporosis 2002    Overview:  Problem list name updated by automated process. Provider to review              Family History   Problem Relation Age of Onset     Brain cancer Mother      Diabetes type II Mother      Stroke Father      Heart disease Brother      Diabetes type II Brother      Dementia Brother      NPH     Dementia Brother      NPH     Leukemia Brother      Cancer Brother      bladder     Social History     Social History     Marital status:      Spouse name: N/A     Number of children: 4     Years of education: N/A     Social History Main Topics     Smoking status: Never Smoker     Smokeless tobacco: Never Used     Alcohol use No     Drug use: No     Sexual activity: Not on file     Other Topics Concern     Not on file     Social History Narrative    2 children have      MEDICATIONS: Reviewed from the MAR, physician orders, and earlier progress notes.  Current Outpatient  "Prescriptions   Medication Sig     acetaminophen (TYLENOL) 500 MG tablet Take 1,000 mg by mouth 3 (three) times a day.     calcium carbonate (OS-ANISA) 600 mg calcium (1,500 mg) tablet Take 600 mg by mouth 2 (two) times a day with meals.     cyanocobalamin 1,000 mcg/mL injection Inject 1,000 mcg into the shoulder, thigh, or buttocks every 30 (thirty) days. Day 20 of every month.     donepezil (ARICEPT) 10 MG tablet Take 0.5 tablets (5 mg total) by mouth at bedtime.     gabapentin (NEURONTIN) 100 MG capsule Take 100 mg by mouth 2 (two) times a day. At 10:00 and at bedtime.     gauze bandage (KERLIX) 2 1/4 X 3 \"-yard Bndg rebandage hand by wrapping individually around fingers loosely, and then bulky wrapping around hand to wrist twice daily or when soaked through. Disp 14 rolls     levothyroxine (SYNTHROID, LEVOTHROID) 75 MCG tablet Take 75 mcg by mouth daily.     multivit with min-folic acid (ONE-A-DAY VITACRAVES) 200 mcg Chew Chew 1 tablet daily.     ALLERGIES:   Allergies   Allergen Reactions     Sulfa (Sulfonamide Antibiotics) Anaphylaxis and Other (See Comments)     Visual disturbances     Actonel [Risedronate]      Adhesive Tape-Silicones      Amitriptyline Other (See Comments)     Agitation, anxiety     Augmentin [Amoxicillin-Pot Clavulanate]      Benadryl [Diphenhydramine Hcl] Other (See Comments)     sedation     Bergamot Oil      Bisphosphonates      Fosamax and Actonel     Celecoxib      Chlordiazepoxide Hcl Nausea And Vomiting     Codeine      Crestor [Rosuvastatin] Other (See Comments) and Dizziness     Stomach cramps     Cyclobenzaprine      Epinephrine Other (See Comments)     Weak, racing heart     Erythromycin      Erythromycin Base      Fosamax [Alendronate]      Guaifenesin Other (See Comments)     Achy, heart pounds     Ibuprofen      Iron Nausea And Vomiting     Latex      Lunesta [Eszopiclone]      Macrolide Antibiotics      Erythromycin     Melatonin Other (See Comments)     Loss of balance     " "Meperidine Other (See Comments)     Hallucinations     Morphine Headache     Naproxen      Niacin      Nsaids (Non-Steroidal Anti-Inflammatory Drug)      Naproxen, ibuprofen, Vioxx, Celebrex     Oxycodone      Paroxetine      Penicillins      Augmentin     Pentazocine      Propoxyphene      Soap      Antibacterial soap     Trazodone Other (See Comments)     Suicidal       Venom-Honey Bee      Vioxx [Rofecoxib] Nausea Only     Voltaren [Diclofenac Sodium]      Zoloft [Sertraline]      DIET: Regular, regular texture, thin liquids.  Mighty Shake.    Vitals:    03/26/18 1755   BP: 105/64   Pulse: 65   Resp: 20   Temp: 97.6  F (36.4  C)   Weight: 163 lb (73.9 kg)   Height: 5' 3.5\" (1.613 m)     Body mass index is 28.42 kg/(m^2).    EXAMINATION:   General: Extremely pleasant, alert, and conversant elderly female, in no apparent distress.  She reads my name teodora from a good distance away, and when I tell her that I'm impressed.  She responds with, \"I'm Camille.\"  Head: Normocephalic and atraumatic.   Eyes: PERRLA, sclerae clear.   ENT: Moist oral mucosa.  She has her own teeth.  No rhinorrhea or nasal discharge.  Hearing seems unimpaired.  Cardiovascular: Regular rate and rhythm without appreciable murmur.  Respiratory: Unable to auscultate lung sounds due to TLSO brace.  Abdomen: Unable to assess due to TLSO brace.  Musculoskeletal/Extremities: Age-related degenerative joint disease.  No peripheral edema.  Integument: No rashes, clinically significant lesions, or skin breakdown.   Cognitive/Psychiatric: Significant confusion for quite pleasant.  She scored 3/15 on the BIMS on 03/14/18.    DIAGNOSTICS:   Results for orders placed or performed during the hospital encounter of 08/21/17   Basic Metabolic Panel   Result Value Ref Range    Sodium 139 136 - 145 mmol/L    Potassium 4.3 3.5 - 5.0 mmol/L    Chloride 101 98 - 107 mmol/L    CO2 31 22 - 31 mmol/L    Anion Gap, Calculation 7 5 - 18 mmol/L    Glucose 94 70 - 125 mg/dL    " Calcium 10.0 8.5 - 10.5 mg/dL    BUN 16 8 - 28 mg/dL    Creatinine 0.85 0.60 - 1.10 mg/dL    GFR MDRD Af Amer >60 >60 mL/min/1.73m2    GFR MDRD Non Af Amer >60 >60 mL/min/1.73m2     Lab Results   Component Value Date    WBC 9.1 03/07/2018    HGB 14.3 03/07/2018    HCT 42.8 03/07/2018     (H) 03/07/2018     03/07/2018     CrCl cannot be calculated (Patient's most recent sCr result is older than the maximum 5 days allowed.).  Lab Results   Component Value Date    ALT 17 03/07/2018    AST 26 03/07/2018    ALKPHOS 81 03/07/2018    BILITOT 0.7 03/07/2018     ASSESSMENT/Plan:      ICD-10-CM    1. T12 compression fracture S22.080A    2. Late onset Alzheimer's disease without behavioral disturbance G30.1     F02.80    3. Major depressive disorder, single episode, mild F32.0    4. Chronic constipation K59.09    5. Hypothyroidism, unspecified type E03.9      CHANGES:    None.    CARE PLAN:    The care plan has been reviewed and all orders signed. Changes to care plan, if any, as noted. Otherwise, continue care plan of care.    The above has been created using voice recognition software. Please be aware that this may unintentionally  produce inaccuracies and/or nonsensical sentences.      Electronically signed by: Kemal Ojeda CNP

## 2021-06-16 NOTE — PROGRESS NOTES
CHART NOTE     DATE OF SERVICE:  3/20/2018     : 1931   86 y.o.     ASSESSMENT :   Doing well with min c/o, stable fx.      PLAN: Continue activity restrictions/brace.  RTC 4 weeks with new xrays.    HPI:  Camille Vang is status post consul by DR. CALDERÓN  On 3-8-18 for T12 FRACTURE.  Patient initially presented with past medical history significant for underlying dementia, and prior fall who presented after a fall followed by persistent back pain radiating to the abdomen.  MRI with moderate T12 compression fracture with 4 mm retropulsion and 50% height loss.  Follow up thoracic XR with TLSO with 70% height loss, severe compression fx, decreased retropulsion. Dc to [facility]. Normally resides at [facility].      TODAY, Camille Vang reports min occasional back pain.  No leg pain.  Wearing brace as instructed. Here today with her son. She is poor historian due to her dementia. Is getting PT/OT at the TCU. 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      PAST MEDICAL HISTORY, SURGICAL HISTORY, REVIEW OF SYMPTOMS, MEDICATIONS AND ALLERGIES:  Past medical history, surgical history, ROS, medications and allergies reviewed with patient and remain unchanged from previous visit.    Past Medical History:   Diagnosis Date     B12 deficiency 2015     Chronic constipation      Dementia without behavioral disturbance, unspecified dementia type 2015     Hypercholesteremia      Hypothyroidism      Major depressive disorder, single episode, mild 4/3/2016     Osteoarthrosis      Osteoporosis 2002    Overview:  Problem list name updated by automated process. Provider to review       PHYSICAL EXAM:     /77  Pulse 100  Temp 98  F (36.7  C)  Resp 20  SpO2 94%    Neurological exam reveals:  Respirations easy, non-labored.   Skin: W/D/I. No rashes, lesions or breaks in integrity.   Recent and remote  memory intact, fund of knowledge wnl.    Alert and oriented x3, speech fluent and appropriate.   Comprehension intact with 2 step crossover command.   Finger nose finger smooth and accurate  PERRL, EOMI, No nystagmus,   Face symmetric, tongue midline, Uvula midline,  palate rises with phonation   Shoulder shrug equal  Arm strength bilateral grasp, biceps, triceps, and deltoids 5/5   Leg strength bilateral dorsiflexion, plantar flexion, and hip flexion 5/5  No extremity edema noted.   Can heel/toe walk, do toe rises and squats without difficulty.   Muscle Bulk and tone wnl.   Reflexes: No pathological reflexes   Gait and station:in WC, not observed.    Incision: CDI without erythema or edema  NDI/NAEEM:      RADIOGRAPHIC IMAGING: XR:  Moderate to marked compression deformity of T12 similar to the previous exam with minor retropulsion of the posterior vertebral margin. No new fracture or subluxation identified. No progressive vertebral height loss.       Films personally reviewed and interpreted.     Reviewed imaging with patient and family.

## 2021-06-16 NOTE — PROGRESS NOTES
[facility]    Facility:   [facility] [[id number]]     Code Status: FULL CODE  [facility]  3/7 -3/10/2018    CHIEF COMPLAINT/REASON FOR VISIT:  Chief Complaint   Patient presents with     Review Of Multiple Medical Conditions       HISTORY:      HPI: Camille is a 86 y.o. female with known Alzheimer's dementia who fell in her facility.   CT showing a T12 compression fracture with retropulsion of fragments.  There were no neuro deficits.  MRI showed moderate T12 compression fracture with a 4-5 mm retropulsion and 50% height loss.  She was placed in a TLSO.  Repeat thoracic spine film 2 days later showed a 70% height loss, severe compression fracture but less retropulsion.  Neurosurgery recommended conservative management.    Opioids were not used, she seemed to be fairly comfortable with acetaminophen in the hospital.  She was on donepezil for her dementia, but it was decreased as it may be a contributor to her falls.    Incidentally found on imaging:  a nonobstructing stone in the left lower kidney pole.      Past Medical History:   Diagnosis Date     B12 deficiency 6/2/2015     Chronic constipation      Dementia without behavioral disturbance, unspecified dementia type 11/11/2015     Hypercholesteremia      Hypothyroidism      Major depressive disorder, single episode, mild 4/3/2016     Osteoarthrosis      Osteoporosis 11/5/2002    Overview:  Problem list name updated by automated process. Provider to review         Review of Systems   Her back hurts, states she sleeps well and is eating fine  She is unable to recollect where she lived previously    .  Vitals:    03/15/18 0900   BP: 117/81   Pulse: 84   Resp: 18   Temp: 98.3  F (36.8  C)   SpO2: 95%       Physical Exam  Not very talkative, somewhat irritable  Appears very tired  Cardiovascular: Regular rhythm, normal heart sounds, no murmur.    Pulmonary/Chest: Breath sounds normal. She has no wheezes. She has no rales.  "  Abdominal: Soft. Bowel sounds are normal, no distension, nontender to palpation (is supine in bed without her TLSO).   Musculoskeletal: Needing assisted to get up from laying with a TLSO to sitting at the bedside, heavy assist of 1 to stand up a walker  Decreased range of motion, all extremities  Low muscle mass, weakness symmetric .   Neurological: She is alert. Coordination normal. Pretty profound dementia, she chatters but little reality based content, her speech flows in and out of past and present  Oriented to self only . No memory of having back bone fracture  Skin: Skin is warm, dry, no rash.   Psychiatric: She is a little testy as she needs help and can't move on her own accord    Current Outpatient Prescriptions   Medication Sig     acetaminophen (TYLENOL) 500 MG tablet Take 1,000 mg by mouth 3 (three) times a day.     calcium carbonate (OS-ANISA) 600 mg calcium (1,500 mg) tablet Take 600 mg by mouth 2 (two) times a day with meals.     cyanocobalamin 1,000 mcg/mL injection Inject 1,000 mcg into the shoulder, thigh, or buttocks every 30 (thirty) days. Day 20 of every month.     donepezil (ARICEPT) 10 MG tablet Take 0.5 tablets (5 mg total) by mouth at bedtime.     gabapentin (NEURONTIN) 100 MG capsule Take 100 mg by mouth 2 (two) times a day. At 10:00 and at bedtime.     gauze bandage (KERLIX) 2 1/4 X 3 \"-yard Bndg rebandage hand by wrapping individually around fingers loosely, and then bulky wrapping around hand to wrist twice daily or when soaked through. Disp 14 rolls     levothyroxine (SYNTHROID, LEVOTHROID) 75 MCG tablet Take 75 mcg by mouth daily.     multivit with min-folic acid (ONE-A-DAY VITACRAVES) 200 mcg Chew Chew 1 tablet daily.         LABS:     Ref Range & Units 3/7/18  5:01 PM    WBC 4.0 - 11.0 thou/uL 9.1   Hemoglobin 12.0 - 16.0 g/dL 14.3   Platelets 140 - 440 thou/uL 200   Neutrophils % 50 - 70 % 79 (H)   Lymphocytes % 20 - 40 % 12 (L)          Ref Range & Units 3/7/18  5:01 PM    Sodium " 136 - 145 mmol/L 139   Potassium 3.5 - 5.0 mmol/L 4.1   Chloride 98 - 107 mmol/L 101   CO2 22 - 31 mmol/L 27   Anion Gap, Calculation 5 - 18 mmol/L 11   Glucose 70 - 125 mg/dL 116   BUN 8 - 28 mg/dL 26   Creatinine 0.60 - 1.10 mg/dL 0.82   GFR MDRD Non Af Amer >60 mL/min/1.73m2 >60   Bilirubin, Total 0.0 - 1.0 mg/dL 0.7   Calcium 8.5 - 10.5 mg/dL 10.1   Protein, Total 6.0 - 8.0 g/dL 8.0   Albumin 3.5 - 5.0 g/dL 3.6   Alkaline Phosphatase 45 - 120 U/L 81   AST 0 - 40 U/L 26   ALT 0 - 45 U/L 17         ASSESSMENT:      ICD-10-CM    1. T12 compression fracture S22.080A    2. Fall, subsequent encounter W19.XXXD    3. Osteoporosis M81.0    4. Late onset Alzheimer's disease without behavioral disturbance G30.1     F02.80    5. Osteoarthrosis M19.90          PLAN:    Continue therapies.  Overall the nurses feels she is relatively comfortable except for position changes.  Needs moderate assist of 1-2 at all times    Electronically signed by: Amanda Moran MD

## 2021-06-16 NOTE — PROGRESS NOTES
"[facility]    Name:   Camille Vang  : 1931  Facility:   [facility] [[id number]]   Room: TCU3-[facility]  Code Status: FULL CODE -   Fac type:   SNF (Skilled Nursing Facility, TCU) -     CHIEF COMPLAINT / REASON FOR VISIT:  Chief Complaint   Patient presents with     Review Of Multiple Medical Conditions     TCU follow-up after hospitalization for a T12 compression fracture.    Patient was seen by Dr. Moran for an admission visit on 18 and subsequently seen on 18 in follow-up.    HPI: Camille is an 86 y.o. female with underlying Alzheimer's dementia but without any behavioral disturbance.  She had suffered a prior fall and presented with persistent back pain radiating to the abdomen.  A CT of the abdomen and pelvis showed a T12 compression fracture with retropulsion.  She did not experience any associated neurological deficits.    MRI with moderate T12 compression fracture with 4 mm retropulsion and 50% height loss.  Follow-up thoracic x-ray with TLSO with 70% height loss, severe compression fracture but better retropulsion.  Neurosurgery recommended conservative management.  Her pain was well-controlled with acetaminophen and no opioids.  She has a large number of listed allergies.  PT/OT was recommended, and she was transferred here to the TCU.  Her donepezil was decreased to avoid further risk of falls.  She is to follow-up with neurosurgery in 2 weeks.      CURRENT ISSUES    She does have a diagnosis of depression, although she is quite pleasant and rather humorous.  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  While she cannot tell me the year (she tells me, \"no, do not do these things to me.  I cannot bear it!\")  Or even what year she was born, she can recite her lines from a play by heart.  She scored only 3/15 on a recent BIMS assessment.    She does find her TLSO somewhat uncomfortable, but her pain is improved now that she is on " "scheduled acetaminophen.  She does require extensive assist for transfer from her bed to her wheelchair but, once she is up, she seems to be ambulating just fine.  She is quite compliant with cares.    She is currently residing at [facility] in [place].    ROS: She does tell me that her back hurts.  She notes that it is \"very bad\" and \"constantly.\"  Nonetheless, she does seem to be handling it well, and she denies any shooting pains down the legs.  No mention of headaches or chest pains, coughing or congestion, nausea or vomiting, dizziness or dyspnea, dysuria, constipation or diarrhea, difficulty chewing or swallowing, integumentary issues, problems with appetite or sleep.    Past Medical History:   Diagnosis Date     B12 deficiency 2015     Chronic constipation      Dementia without behavioral disturbance, unspecified dementia type 2015     Hypercholesteremia      Hypothyroidism      Major depressive disorder, single episode, mild 4/3/2016     Osteoarthrosis      Osteoporosis 2002    Overview:  Problem list name updated by automated process. Provider to review              Family History   Problem Relation Age of Onset     Brain cancer Mother      Diabetes type II Mother      Stroke Father      Heart disease Brother      Diabetes type II Brother      Dementia Brother      NPH     Dementia Brother      NPH     Leukemia Brother      Cancer Brother      bladder     Social History     Social History     Marital status:      Spouse name: N/A     Number of children: 4     Years of education: N/A     Social History Main Topics     Smoking status: Never Smoker     Smokeless tobacco: Never Used     Alcohol use No     Drug use: No     Sexual activity: Not on file     Other Topics Concern     Not on file     Social History Narrative    2 children have      MEDICATIONS: Reviewed from the MAR, physician orders, and earlier progress notes.  Current Outpatient Prescriptions   Medication Sig " "    acetaminophen (TYLENOL) 500 MG tablet Take 1,000 mg by mouth 3 (three) times a day.     calcium carbonate (OS-ANISA) 600 mg calcium (1,500 mg) tablet Take 600 mg by mouth 2 (two) times a day with meals.     cyanocobalamin 1,000 mcg/mL injection Inject 1,000 mcg into the shoulder, thigh, or buttocks every 30 (thirty) days. Day 20 of every month.     donepezil (ARICEPT) 10 MG tablet Take 0.5 tablets (5 mg total) by mouth at bedtime.     gabapentin (NEURONTIN) 100 MG capsule Take 100 mg by mouth 2 (two) times a day. At 10:00 and at bedtime.     gauze bandage (KERLIX) 2 1/4 X 3 \"-yard Bndg rebandage hand by wrapping individually around fingers loosely, and then bulky wrapping around hand to wrist twice daily or when soaked through. Disp 14 rolls     levothyroxine (SYNTHROID, LEVOTHROID) 75 MCG tablet Take 75 mcg by mouth daily.     multivit with min-folic acid (ONE-A-DAY VITACRAVES) 200 mcg Chew Chew 1 tablet daily.     ALLERGIES:   Allergies   Allergen Reactions     Sulfa (Sulfonamide Antibiotics) Anaphylaxis and Other (See Comments)     Visual disturbances     Actonel [Risedronate]      Adhesive Tape-Silicones      Amitriptyline Other (See Comments)     Agitation, anxiety     Augmentin [Amoxicillin-Pot Clavulanate]      Benadryl [Diphenhydramine Hcl] Other (See Comments)     sedation     Bergamot Oil      Bisphosphonates      Fosamax and Actonel     Celecoxib      Chlordiazepoxide Hcl Nausea And Vomiting     Codeine      Crestor [Rosuvastatin] Other (See Comments) and Dizziness     Stomach cramps     Cyclobenzaprine      Epinephrine Other (See Comments)     Weak, racing heart     Erythromycin      Erythromycin Base      Fosamax [Alendronate]      Guaifenesin Other (See Comments)     Achy, heart pounds     Ibuprofen      Iron Nausea And Vomiting     Latex      Lunesta [Eszopiclone]      Macrolide Antibiotics      Erythromycin     Melatonin Other (See Comments)     Loss of balance     Meperidine Other (See Comments) " "    Hallucinations     Morphine Headache     Naproxen      Niacin      Nsaids (Non-Steroidal Anti-Inflammatory Drug)      Naproxen, ibuprofen, Vioxx, Celebrex     Oxycodone      Paroxetine      Penicillins      Augmentin     Pentazocine      Propoxyphene      Soap      Antibacterial soap     Trazodone Other (See Comments)     Suicidal       Venom-Honey Bee      Vioxx [Rofecoxib] Nausea Only     Voltaren [Diclofenac Sodium]      Zoloft [Sertraline]      DIET: Regular, regular texture, thin liquids.  Mighty Shake.    Vitals:    03/21/18 1605   BP: 120/61   Pulse: 78   Resp: 18   Temp: 97.5  F (36.4  C)   Height: 5' 3.5\" (1.613 m)     There is no height or weight on file to calculate BMI.    EXAMINATION:   General: Extremely pleasant, alert, and conversant elderly female, first being helped into her TLSO and then assisted to transfer from bed to wheelchair, in no apparent distress despite her verbal complaints of back pain.  While she is quite confused, she has an excellent sense of humor and can recall her lines from a play she must have done years ago.  Head: Normocephalic and atraumatic.   Eyes: PERRLA, sclerae clear.   ENT: Moist oral mucosa.  She has her own teeth.  No rhinorrhea or nasal discharge.  Hearing seems unimpaired.  Cardiovascular: Unable to auscultate cardiac sounds due to TLSO brace.  Respiratory: Unable to auscultate lung sounds due to TLSO brace.  Abdomen: Unable to assess due to TLSO brace.  Musculoskeletal/Extremities: Age-related degenerative joint disease.  No peripheral edema.  Integument: No rashes, clinically significant lesions, or skin breakdown.   Cognitive/Psychiatric: Significant confusion for quite pleasant.  She scored 3/15 on the BIMS on 03/14/18.    DIAGNOSTICS:   Results for orders placed or performed during the hospital encounter of 08/21/17   Basic Metabolic Panel   Result Value Ref Range    Sodium 139 136 - 145 mmol/L    Potassium 4.3 3.5 - 5.0 mmol/L    Chloride 101 98 - 107 " mmol/L    CO2 31 22 - 31 mmol/L    Anion Gap, Calculation 7 5 - 18 mmol/L    Glucose 94 70 - 125 mg/dL    Calcium 10.0 8.5 - 10.5 mg/dL    BUN 16 8 - 28 mg/dL    Creatinine 0.85 0.60 - 1.10 mg/dL    GFR MDRD Af Amer >60 >60 mL/min/1.73m2    GFR MDRD Non Af Amer >60 >60 mL/min/1.73m2     Lab Results   Component Value Date    WBC 9.1 03/07/2018    HGB 14.3 03/07/2018    HCT 42.8 03/07/2018     (H) 03/07/2018     03/07/2018     CrCl cannot be calculated (Patient's most recent sCr result is older than the maximum 5 days allowed.).  Lab Results   Component Value Date    ALT 17 03/07/2018    AST 26 03/07/2018    ALKPHOS 81 03/07/2018    BILITOT 0.7 03/07/2018     ASSESSMENT/Plan:      ICD-10-CM    1. T12 compression fracture S22.080A    2. Late onset Alzheimer's disease without behavioral disturbance G30.1     F02.80    3. Major depressive disorder, single episode, mild F32.0    4. Chronic constipation K59.09      CHANGES:    None.    CARE PLAN:    The care plan has been reviewed and all orders signed. Changes to care plan, if any, as noted. Otherwise, continue care plan of care.    The above has been created using voice recognition software. Please be aware that this may unintentionally  produce inaccuracies and/or nonsensical sentences.      Electronically signed by: Kemal Ojeda CNP

## 2021-06-17 NOTE — PROGRESS NOTES
[facility]    Name:   Camille Vang  : 1931  Facility:   [facility] [[id number]]   Room: TCU3-314  Code Status: FULL CODE -   Fac type:   SNF (Skilled Nursing Facility, TCU) -     CHIEF COMPLAINT / REASON FOR VISIT:  Chief Complaint   Patient presents with     Discharge Summary   [facility] from 18 until 03/10/18  [facility] from 03/10/18 until 18      HPI: Camille is an 86 y.o. female with underlying Alzheimer's dementia but without any behavioral disturbance.  She had suffered a prior fall and presented with persistent back pain radiating to the abdomen.  A CT of the abdomen and pelvis showed a T12 compression fracture with retropulsion.  She did not experience any associated neurological deficits.    MRI with moderate T12 compression fracture with 4 mm retropulsion and 50% height loss.  Follow-up thoracic x-ray with TLSO with 70% height loss, severe compression fracture but better retropulsion.  Neurosurgery recommended conservative management.  Her pain was well-controlled with acetaminophen and no opioids.  She has a large number of listed allergies.  PT/OT was recommended, and she was transferred here to the TCU.  Her donepezil was decreased to avoid further risk of falls.  She is to follow-up with neurosurgery in 2 weeks.    Other issues: She has hypothyroidism and is on 75 mcg of levothyroxine daily.      TCU AND DISCHARGE ISSUES    She does have a diagnosis of depression, although she is quite pleasant, rather humorous, and is always smiling broadly when I see her.  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  At one point, she proceeded to go through a home monologue with me at the audience.  Dementia is at least moderate.  She has scored 2-4/15 on the BIMS assessment since her admission and, while she seems much more oriented than her score would suggest, she is very confused, and I could easily see  how she would become hostile.      She does need help in feeding, as she will sit and look at her food without constant coaxing.  She has lost a few pounds despite getting Mighty Shake, added on 03/16/18.     She wears a TLSO brace, and her pain is improved now that she is on scheduled acetaminophen.  She does require extensive assist for transfer from her bed to her wheelchair but, once she is up, she seems to be ambulating just fine, although she requires assist of one person.  She is quite compliant with cares.  She has a low bed and floor pad alongside in case she should attempt to get up on her own.      The patient's mobility cannot be sufficiently resolved by the use of a walker or cane due to a recent wedge compression fracture and general difficulty ambulating.  She will be discharging to an assisted living facility which provides adequate access between rooms, maneuvering space and surfaces for the use of a standard wheelchair.  The use of a wheelchair will significantly improve the patient's ability to participate in MRADLs due to mobility limitation, poor balance, and inability to stand without assist.  From the wheelchair, the patient is able to participate in ADLs, including dressing, toileting, and transfers.  She has 24-hour caregivers that are able to safely use the wheelchair.  She will be using the wheelchair daily at home.    She is currently residing at [facility] in [place].    ROS: She denies any current back pain.  She denies any shooting pains down the legs.  No mention of headaches or chest pains, coughing or congestion, nausea or vomiting, dizziness or dyspnea, dysuria, constipation or diarrhea, difficulty chewing or swallowing, integumentary issues, problems with appetite or sleep.    Past Medical History:   Diagnosis Date     B12 deficiency 6/2/2015     Chronic constipation      Dementia without behavioral disturbance, unspecified dementia type 11/11/2015      "Hypercholesteremia      Hypothyroidism      Major depressive disorder, single episode, mild 4/3/2016     Osteoarthrosis      Osteoporosis 2002    Overview:  Problem list name updated by automated process. Provider to review              Family History   Problem Relation Age of Onset     Brain cancer Mother      Diabetes type II Mother      Stroke Father      Heart disease Brother      Diabetes type II Brother      Dementia Brother      NPH     Dementia Brother      NPH     Leukemia Brother      Cancer Brother      bladder     Social History     Social History     Marital status:      Spouse name: N/A     Number of children: 4     Years of education: N/A     Social History Main Topics     Smoking status: Never Smoker     Smokeless tobacco: Never Used     Alcohol use No     Drug use: No     Sexual activity: Not on file     Other Topics Concern     Not on file     Social History Narrative    2 children have      MEDICATIONS: Reviewed from the MAR, physician orders, and earlier progress notes.  Current Outpatient Prescriptions   Medication Sig     acetaminophen (TYLENOL) 500 MG tablet Take 1,000 mg by mouth 3 (three) times a day.     calcium carbonate (OS-ANISA) 600 mg calcium (1,500 mg) tablet Take 600 mg by mouth 2 (two) times a day with meals.     cyanocobalamin 1,000 mcg/mL injection Inject 1,000 mcg into the shoulder, thigh, or buttocks every 30 (thirty) days. Day 20 of every month.     donepezil (ARICEPT) 10 MG tablet Take 0.5 tablets (5 mg total) by mouth at bedtime.     gabapentin (NEURONTIN) 100 MG capsule Take 100 mg by mouth 2 (two) times a day. At 10:00 and at bedtime.     gauze bandage (KERLIX) 2 1/4 X 3 \"-yard Bndg rebandage hand by wrapping individually around fingers loosely, and then bulky wrapping around hand to wrist twice daily or when soaked through. Disp 14 rolls     levothyroxine (SYNTHROID, LEVOTHROID) 75 MCG tablet Take 75 mcg by mouth daily.     multivit with min-folic acid " "(ONE-A-DAY VITACRAVES) 200 mcg Chew Chew 1 tablet daily.     ALLERGIES:   Allergies   Allergen Reactions     Sulfa (Sulfonamide Antibiotics) Anaphylaxis and Other (See Comments)     Visual disturbances     Actonel [Risedronate]      Adhesive Tape-Silicones      Amitriptyline Other (See Comments)     Agitation, anxiety     Augmentin [Amoxicillin-Pot Clavulanate]      Benadryl [Diphenhydramine Hcl] Other (See Comments)     sedation     Bergamot Oil      Bisphosphonates      Fosamax and Actonel     Celecoxib      Chlordiazepoxide Hcl Nausea And Vomiting     Codeine      Crestor [Rosuvastatin] Other (See Comments) and Dizziness     Stomach cramps     Cyclobenzaprine      Epinephrine Other (See Comments)     Weak, racing heart     Erythromycin      Erythromycin Base      Fosamax [Alendronate]      Guaifenesin Other (See Comments)     Achy, heart pounds     Ibuprofen      Iron Nausea And Vomiting     Latex      Lunesta [Eszopiclone]      Macrolide Antibiotics      Erythromycin     Melatonin Other (See Comments)     Loss of balance     Meperidine Other (See Comments)     Hallucinations     Morphine Headache     Naproxen      Niacin      Nsaids (Non-Steroidal Anti-Inflammatory Drug)      Naproxen, ibuprofen, Vioxx, Celebrex     Oxycodone      Paroxetine      Penicillins      Augmentin     Pentazocine      Propoxyphene      Soap      Antibacterial soap     Trazodone Other (See Comments)     Suicidal       Venom-Honey Bee      Vioxx [Rofecoxib] Nausea Only     Voltaren [Diclofenac Sodium]      Zoloft [Sertraline]      DIET: Regular, regular texture, thin liquids.  Mighty Shake.    Vitals:    04/11/18 1051   BP: 118/71   Pulse: 82   Resp: 18   Temp: 97.6  F (36.4  C)   SpO2: 96%   Weight: 155 lb 9.6 oz (70.6 kg)   Height: 5' 3.5\" (1.613 m)     Body mass index is 27.13 kg/(m^2).    EXAMINATION:   General: Extremely pleasant but quite confused elderly female, sitting at the breakfast table, in no apparent distress.  She is being " fed by one of the nursing assistance.  Head: Normocephalic and atraumatic.   Eyes: PERRLA, sclerae clear.   ENT: Moist oral mucosa.  She has her own teeth.  No rhinorrhea or nasal discharge.  Hearing seems unimpaired.  Cardiovascular: Regular rate and rhythm without appreciable murmur.  Respiratory: Lungs are clear to auscultation bilaterally.  Abdomen: Soft and nontender with positive bowel sounds.  Musculoskeletal/Extremities: Age-related degenerative joint disease.  No peripheral edema.  Integument: No rashes, clinically significant lesions, or skin breakdown.   Cognitive/Psychiatric: Significant confusion for quite pleasant.  She scored 3/15 on the BIMS on 03/14/18 and then scored 4/15 on 03/22/18, followed by 2/15 on 04/06/18.    DIAGNOSTICS:   Results for orders placed or performed during the hospital encounter of 08/21/17   Basic Metabolic Panel   Result Value Ref Range    Sodium 139 136 - 145 mmol/L    Potassium 4.3 3.5 - 5.0 mmol/L    Chloride 101 98 - 107 mmol/L    CO2 31 22 - 31 mmol/L    Anion Gap, Calculation 7 5 - 18 mmol/L    Glucose 94 70 - 125 mg/dL    Calcium 10.0 8.5 - 10.5 mg/dL    BUN 16 8 - 28 mg/dL    Creatinine 0.85 0.60 - 1.10 mg/dL    GFR MDRD Af Amer >60 >60 mL/min/1.73m2    GFR MDRD Non Af Amer >60 >60 mL/min/1.73m2     Lab Results   Component Value Date    WBC 9.1 03/07/2018    HGB 14.3 03/07/2018    HCT 42.8 03/07/2018     (H) 03/07/2018     03/07/2018     CrCl cannot be calculated (Patient's most recent sCr result is older than the maximum 5 days allowed.).  Lab Results   Component Value Date    ALT 17 03/07/2018    AST 26 03/07/2018    ALKPHOS 81 03/07/2018    BILITOT 0.7 03/07/2018      No results found for: TSH    ASSESSMENT/Plan:      ICD-10-CM    1. Late onset Alzheimer's disease without behavioral disturbance G30.1     F02.80    2. T12 compression fracture S22.080A    3. Major depressive disorder, single episode, mild F32.0    4. Chronic constipation K59.09    5.  Osteoarthrosis M19.90    6. Hypothyroidism, unspecified type E03.9    7. Osteoporosis M81.0      Patient is discharging to [facility] on 04/12/18.    DISCHARGE PLAN/FACE TO FACE:  I certify that this patient is under my care and that I had a face-to-face encounter that meets the physician face-to-face encounter requirements with this patient.     Date of Face-to-Face Encounter:  04/11/18     I certify that, based on my findings, the following services are medically necessary home health services: Home PT and OT, as well as assist with all mobility.    My clinical findings support the need for the above skilled services because: (Please write a brief narrative summary that describes what the RN, PT, SLP, or other services will be doing in the home. A list of diagnoses in this section does not meet the CMS requirements): Issues for this patient include significant Alzheimer's dementia as well as gait instability when ambulating without assistance.  Physical therapy will work with her on this, and occupational therapy will work on cognitive issues in the setting of her current surroundings.    This patient is homebound because: (Please write a brief narrative summmary describing the functional limitations as to why this patient is homebound and specifically what makes this patient homebound.):  Alzheimer's dementia and an inability to drive make her homebound.    The patient is, or has been, under my care and I have initiated the establishment of the plan of care. This patient will be followed by a physician who will periodically review the plan of care.    Approximate time spent with this patient was greater than 30 minutes with greater than 50% spent in discussions regarding services required upon discharge.      The above has been created using voice recognition software. Please be aware that this may unintentionally  produce inaccuracies and/or nonsensical sentences.      Electronically signed by: Kemal  Ray Ojeda, CNP

## 2021-06-17 NOTE — PROGRESS NOTES
"CHART NOTE     DATE OF SERVICE:  2018     : 1931   86 y.o.     ASSESSMENT :   Doing well with improvement in symptoms and function.  Fx stable.      PLAN: RTC 6 weeks with new xrays, anticipate wean from brace at that time.     HPI:  Camille Vang is status post  consult by DR. CALDERÓN  On 3-8-18 for T12 FRACTURE.  Patient initially presented with past medical history significant for underlying dementia, and prior fall who presented after a fall followed by persistent back pain radiating to the abdomen.  MRI with moderate T12 compression fracture with 4 mm retropulsion and 50% height loss.  Follow up thoracic XR with TLSO with 70% height loss, severe compression fx, decreased retropulsion. Dc to [facility]. Normally resides at [facility].      Last seen in clinic on 3/20/208.  Min pain, no leg pain.  Dx w/Alzheimer's 3 years ago.  Fx stable on imaging. RTC 4 weeks with xrays.     TODAY, Camille Vang reports min back pain, no leg pain or N/T.  In WC today, here with her son. She is now back at [facility] and has [facility] PT/OT.  Son does not feel she was walking much at TCU and is hoping home PT/OT will mobilize her.      PAST MEDICAL HISTORY, SURGICAL HISTORY, REVIEW OF SYMPTOMS, MEDICATIONS AND ALLERGIES:  Past medical history, surgical history, ROS, medications and allergies reviewed with patient and remain unchanged from previous visit.    Past Medical History:   Diagnosis Date     B12 deficiency 2015     Chronic constipation      Dementia without behavioral disturbance, unspecified dementia type 2015     Hypercholesteremia      Hypothyroidism      Major depressive disorder, single episode, mild 4/3/2016     Osteoarthrosis      Osteoporosis 2002    Overview:  Problem list name updated by automated process. Provider to review       PHYSICAL EXAM:    /57  Ht 5' 3.5\" (1.613 m)  Wt 155 lb (70.3 kg)  SpO2 95%  BMI 27.03 kg/m2    Neurological " exam reveals:  Respirations easy, non-labored.   Skin: W/D/I. No rashes, lesions or breaks in integrity.   Recent and remote memory intact, fund of knowledge wnl.    Alert and oriented x3, speech fluent and appropriate.   PERRL, EOMI, No nystagmus,   Face symmetric, tongue midline, Uvula midline,  palate rises with phonation   Shoulder shrug equal  Leg strength bilateral dorsiflexion, plantar flexion, and hip flexion 5/5  No extremity edema noted.   Muscle Bulk and tone wnl.   Reflexes: No pathological reflexes   Gait and station:not observed, in WC.     RADIOGRAPHIC IMAGING: XR:  Moderate to severe T12 compression fracture deformity not definitely changed. No new fracture is evident. Unchanged alignment.      Films personally reviewed and interpreted.  Reviewed imaging with patient and family.

## 2021-06-17 NOTE — PROGRESS NOTES
[facility]    Name:   Camille Vang  : 1931  Facility:   [facility] [[id number]]   Room: TCU3-314  Code Status: FULL CODE -   Fac type:   SNF (Skilled Nursing Facility, TCU) -     CHIEF COMPLAINT / REASON FOR VISIT:  Chief Complaint   Patient presents with     Review Of Multiple Medical Conditions     TCU follow-up after hospitalization for a T12 compression fracture.   [facility] from 18 until 03/10/18    Patient was seen by me on 18.    HPI: Camille is an 86 y.o. female with underlying Alzheimer's dementia but without any behavioral disturbance.  She had suffered a prior fall and presented with persistent back pain radiating to the abdomen.  A CT of the abdomen and pelvis showed a T12 compression fracture with retropulsion.  She did not experience any associated neurological deficits.    MRI with moderate T12 compression fracture with 4 mm retropulsion and 50% height loss.  Follow-up thoracic x-ray with TLSO with 70% height loss, severe compression fracture but better retropulsion.  Neurosurgery recommended conservative management.  Her pain was well-controlled with acetaminophen and no opioids.  She has a large number of listed allergies.  PT/OT was recommended, and she was transferred here to the TCU.  Her donepezil was decreased to avoid further risk of falls.  She is to follow-up with neurosurgery in 2 weeks.    Other issues: She has hypothyroidism and is on 75 mcg of levothyroxine daily.      CURRENT ISSUES    She does have a diagnosis of depression, although she is quite pleasant, rather humorous, and is always smiling broadly when I see her.  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  Dementia is at least moderate.  She has scored 3-4/15 on the BIMS assessment since her admission but seems much more oriented than her score would suggest.      She wears a TLSO brace, and her pain is improved now that she is on  scheduled acetaminophen.  She does require extensive assist for transfer from her bed to her wheelchair but, once she is up, she seems to be ambulating just fine.  She is quite compliant with cares.    She is currently residing at [facility] in [place].    ROS: She denies any current back pain.  She denies any shooting pains down the legs.  No mention of headaches or chest pains, coughing or congestion, nausea or vomiting, dizziness or dyspnea, dysuria, constipation or diarrhea, difficulty chewing or swallowing, integumentary issues, problems with appetite or sleep.    Past Medical History:   Diagnosis Date     B12 deficiency 2015     Chronic constipation      Dementia without behavioral disturbance, unspecified dementia type 2015     Hypercholesteremia      Hypothyroidism      Major depressive disorder, single episode, mild 4/3/2016     Osteoarthrosis      Osteoporosis 2002    Overview:  Problem list name updated by automated process. Provider to review              Family History   Problem Relation Age of Onset     Brain cancer Mother      Diabetes type II Mother      Stroke Father      Heart disease Brother      Diabetes type II Brother      Dementia Brother      NPH     Dementia Brother      NPH     Leukemia Brother      Cancer Brother      bladder     Social History     Social History     Marital status:      Spouse name: N/A     Number of children: 4     Years of education: N/A     Social History Main Topics     Smoking status: Never Smoker     Smokeless tobacco: Never Used     Alcohol use No     Drug use: No     Sexual activity: Not on file     Other Topics Concern     Not on file     Social History Narrative    2 children have      MEDICATIONS: Reviewed from the MAR, physician orders, and earlier progress notes.  Current Outpatient Prescriptions   Medication Sig     acetaminophen (TYLENOL) 500 MG tablet Take 1,000 mg by mouth 3 (three) times a day.     calcium carbonate  "(OS-ANISA) 600 mg calcium (1,500 mg) tablet Take 600 mg by mouth 2 (two) times a day with meals.     cyanocobalamin 1,000 mcg/mL injection Inject 1,000 mcg into the shoulder, thigh, or buttocks every 30 (thirty) days. Day 20 of every month.     donepezil (ARICEPT) 10 MG tablet Take 0.5 tablets (5 mg total) by mouth at bedtime.     gabapentin (NEURONTIN) 100 MG capsule Take 100 mg by mouth 2 (two) times a day. At 10:00 and at bedtime.     gauze bandage (KERLIX) 2 1/4 X 3 \"-yard Bndg rebandage hand by wrapping individually around fingers loosely, and then bulky wrapping around hand to wrist twice daily or when soaked through. Disp 14 rolls     levothyroxine (SYNTHROID, LEVOTHROID) 75 MCG tablet Take 75 mcg by mouth daily.     multivit with min-folic acid (ONE-A-DAY VITACRAVES) 200 mcg Chew Chew 1 tablet daily.     ALLERGIES:   Allergies   Allergen Reactions     Sulfa (Sulfonamide Antibiotics) Anaphylaxis and Other (See Comments)     Visual disturbances     Actonel [Risedronate]      Adhesive Tape-Silicones      Amitriptyline Other (See Comments)     Agitation, anxiety     Augmentin [Amoxicillin-Pot Clavulanate]      Benadryl [Diphenhydramine Hcl] Other (See Comments)     sedation     Bergamot Oil      Bisphosphonates      Fosamax and Actonel     Celecoxib      Chlordiazepoxide Hcl Nausea And Vomiting     Codeine      Crestor [Rosuvastatin] Other (See Comments) and Dizziness     Stomach cramps     Cyclobenzaprine      Epinephrine Other (See Comments)     Weak, racing heart     Erythromycin      Erythromycin Base      Fosamax [Alendronate]      Guaifenesin Other (See Comments)     Achy, heart pounds     Ibuprofen      Iron Nausea And Vomiting     Latex      Lunesta [Eszopiclone]      Macrolide Antibiotics      Erythromycin     Melatonin Other (See Comments)     Loss of balance     Meperidine Other (See Comments)     Hallucinations     Morphine Headache     Naproxen      Niacin      Nsaids (Non-Steroidal Anti-Inflammatory " "Drug)      Naproxen, ibuprofen, Vioxx, Celebrex     Oxycodone      Paroxetine      Penicillins      Augmentin     Pentazocine      Propoxyphene      Soap      Antibacterial soap     Trazodone Other (See Comments)     Suicidal       Venom-Honey Bee      Vioxx [Rofecoxib] Nausea Only     Voltaren [Diclofenac Sodium]      Zoloft [Sertraline]      DIET: Regular, regular texture, thin liquids.  Mighty Shake.    Vitals:    04/02/18 1214   BP: 114/76   Pulse: 74   Resp: 20   Temp: 98.4  F (36.9  C)   Weight: 157 lb (71.2 kg)   Height: 5' 3.5\" (1.613 m)   Questionable 6 pound weight loss since my last visit on 03/26/18 when she weighed 163 pounds.  Body mass index is 27.38 kg/(m^2).    EXAMINATION:   General: Extremely pleasant, alert, and conversant elderly female, lying in bed and smiling, in no apparent distress.  Currently, she is not wearing her TLSO in bed.  Head: Normocephalic and atraumatic.   Eyes: PERRLA, sclerae clear.   ENT: Moist oral mucosa.  She has her own teeth.  No rhinorrhea or nasal discharge.  Hearing seems unimpaired.  Cardiovascular: Regular rate and rhythm without appreciable murmur.  Respiratory: Lungs are clear to auscultation bilaterally.  Abdomen: Soft and nontender with positive bowel sounds.  Musculoskeletal/Extremities: Age-related degenerative joint disease.  No peripheral edema.  Integument: No rashes, clinically significant lesions, or skin breakdown.   Cognitive/Psychiatric: Significant confusion for quite pleasant.  She scored 3/15 on the BIMS on 03/14/18 and then scored 4/15 on 03/22/18.    DIAGNOSTICS:   Results for orders placed or performed during the hospital encounter of 08/21/17   Basic Metabolic Panel   Result Value Ref Range    Sodium 139 136 - 145 mmol/L    Potassium 4.3 3.5 - 5.0 mmol/L    Chloride 101 98 - 107 mmol/L    CO2 31 22 - 31 mmol/L    Anion Gap, Calculation 7 5 - 18 mmol/L    Glucose 94 70 - 125 mg/dL    Calcium 10.0 8.5 - 10.5 mg/dL    BUN 16 8 - 28 mg/dL    " Creatinine 0.85 0.60 - 1.10 mg/dL    GFR MDRD Af Amer >60 >60 mL/min/1.73m2    GFR MDRD Non Af Amer >60 >60 mL/min/1.73m2     Lab Results   Component Value Date    WBC 9.1 03/07/2018    HGB 14.3 03/07/2018    HCT 42.8 03/07/2018     (H) 03/07/2018     03/07/2018     CrCl cannot be calculated (Patient's most recent sCr result is older than the maximum 5 days allowed.).  Lab Results   Component Value Date    ALT 17 03/07/2018    AST 26 03/07/2018    ALKPHOS 81 03/07/2018    BILITOT 0.7 03/07/2018     ASSESSMENT/Plan:      ICD-10-CM    1. T12 compression fracture S22.080A    2. Late onset Alzheimer's disease without behavioral disturbance G30.1     F02.80    3. Major depressive disorder, single episode, mild F32.0    4. Chronic constipation K59.09    5. Hypothyroidism, unspecified type E03.9      CHANGES:    None.    CARE PLAN:    The care plan has been reviewed and all orders signed. Changes to care plan, if any, as noted. Otherwise, continue care plan of care.    The above has been created using voice recognition software. Please be aware that this may unintentionally  produce inaccuracies and/or nonsensical sentences.      Electronically signed by: Kemal Ojeda CNP

## 2021-06-17 NOTE — PROGRESS NOTES
[facility]    Name:   Camille Vang  : 1931  Facility:   [facility] [[id number]]   Room: TCU3-314  Code Status: FULL CODE -   Fac type:   SNF (Skilled Nursing Facility, TCU) -     CHIEF COMPLAINT / REASON FOR VISIT:  Chief Complaint   Patient presents with     Review Of Multiple Medical Conditions     TCU follow-up after hospitalization for a T12 compression fracture.   [facility] from 18 until 03/10/18    Patient was seen by me on 18.    HPI: Camille is an 86 y.o. female with underlying Alzheimer's dementia but without any behavioral disturbance.  She had suffered a prior fall and presented with persistent back pain radiating to the abdomen.  A CT of the abdomen and pelvis showed a T12 compression fracture with retropulsion.  She did not experience any associated neurological deficits.    MRI with moderate T12 compression fracture with 4 mm retropulsion and 50% height loss.  Follow-up thoracic x-ray with TLSO with 70% height loss, severe compression fracture but better retropulsion.  Neurosurgery recommended conservative management.  Her pain was well-controlled with acetaminophen and no opioids.  She has a large number of listed allergies.  PT/OT was recommended, and she was transferred here to the TCU.  Her donepezil was decreased to avoid further risk of falls.  She is to follow-up with neurosurgery in 2 weeks.    Other issues: She has hypothyroidism and is on 75 mcg of levothyroxine daily.      CURRENT ISSUES    She does have a diagnosis of depression, although she is quite pleasant, rather humorous, and is always smiling broadly when I see her.  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  Dementia is at least moderate.  She has scored 3-4/15 on the BIMS assessment since her admission but seems much more oriented than her score would suggest.      She has a low bed and floor pad alongside in case she should attempt to  get up on her own.    She wears a TLSO brace, and her pain is improved now that she is on scheduled acetaminophen.  She does require extensive assist for transfer from her bed to her wheelchair but, once she is up, she seems to be ambulating just fine.  She is quite compliant with cares.    She is currently residing at [facility] in [place].    ROS: She denies any current back pain.  She denies any shooting pains down the legs.  No mention of headaches or chest pains, coughing or congestion, nausea or vomiting, dizziness or dyspnea, dysuria, constipation or diarrhea, difficulty chewing or swallowing, integumentary issues, problems with appetite or sleep.    Past Medical History:   Diagnosis Date     B12 deficiency 2015     Chronic constipation      Dementia without behavioral disturbance, unspecified dementia type 2015     Hypercholesteremia      Hypothyroidism      Major depressive disorder, single episode, mild 4/3/2016     Osteoarthrosis      Osteoporosis 2002    Overview:  Problem list name updated by automated process. Provider to review              Family History   Problem Relation Age of Onset     Brain cancer Mother      Diabetes type II Mother      Stroke Father      Heart disease Brother      Diabetes type II Brother      Dementia Brother      NPH     Dementia Brother      NPH     Leukemia Brother      Cancer Brother      bladder     Social History     Social History     Marital status:      Spouse name: N/A     Number of children: 4     Years of education: N/A     Social History Main Topics     Smoking status: Never Smoker     Smokeless tobacco: Never Used     Alcohol use No     Drug use: No     Sexual activity: Not on file     Other Topics Concern     Not on file     Social History Narrative    2 children have      MEDICATIONS: Reviewed from the MAR, physician orders, and earlier progress notes.  Current Outpatient Prescriptions   Medication Sig     acetaminophen  "(TYLENOL) 500 MG tablet Take 1,000 mg by mouth 3 (three) times a day.     calcium carbonate (OS-ANISA) 600 mg calcium (1,500 mg) tablet Take 600 mg by mouth 2 (two) times a day with meals.     cyanocobalamin 1,000 mcg/mL injection Inject 1,000 mcg into the shoulder, thigh, or buttocks every 30 (thirty) days. Day 20 of every month.     donepezil (ARICEPT) 10 MG tablet Take 0.5 tablets (5 mg total) by mouth at bedtime.     gabapentin (NEURONTIN) 100 MG capsule Take 100 mg by mouth 2 (two) times a day. At 10:00 and at bedtime.     gauze bandage (KERLIX) 2 1/4 X 3 \"-yard Bndg rebandage hand by wrapping individually around fingers loosely, and then bulky wrapping around hand to wrist twice daily or when soaked through. Disp 14 rolls     levothyroxine (SYNTHROID, LEVOTHROID) 75 MCG tablet Take 75 mcg by mouth daily.     multivit with min-folic acid (ONE-A-DAY VITACRAVES) 200 mcg Chew Chew 1 tablet daily.     ALLERGIES:   Allergies   Allergen Reactions     Sulfa (Sulfonamide Antibiotics) Anaphylaxis and Other (See Comments)     Visual disturbances     Actonel [Risedronate]      Adhesive Tape-Silicones      Amitriptyline Other (See Comments)     Agitation, anxiety     Augmentin [Amoxicillin-Pot Clavulanate]      Benadryl [Diphenhydramine Hcl] Other (See Comments)     sedation     Bergamot Oil      Bisphosphonates      Fosamax and Actonel     Celecoxib      Chlordiazepoxide Hcl Nausea And Vomiting     Codeine      Crestor [Rosuvastatin] Other (See Comments) and Dizziness     Stomach cramps     Cyclobenzaprine      Epinephrine Other (See Comments)     Weak, racing heart     Erythromycin      Erythromycin Base      Fosamax [Alendronate]      Guaifenesin Other (See Comments)     Achy, heart pounds     Ibuprofen      Iron Nausea And Vomiting     Latex      Lunesta [Eszopiclone]      Macrolide Antibiotics      Erythromycin     Melatonin Other (See Comments)     Loss of balance     Meperidine Other (See Comments)     Hallucinations " "    Morphine Headache     Naproxen      Niacin      Nsaids (Non-Steroidal Anti-Inflammatory Drug)      Naproxen, ibuprofen, Vioxx, Celebrex     Oxycodone      Paroxetine      Penicillins      Augmentin     Pentazocine      Propoxyphene      Soap      Antibacterial soap     Trazodone Other (See Comments)     Suicidal       Venom-Honey Bee      Vioxx [Rofecoxib] Nausea Only     Voltaren [Diclofenac Sodium]      Zoloft [Sertraline]      DIET: Regular, regular texture, thin liquids.  Mighty Shake.    Vitals:    04/06/18 1110   BP: 115/73   Pulse: 84   Resp: 18   Temp: 97  F (36.1  C)   Weight: 159 lb 6.4 oz (72.3 kg)   Height: 5' 3.5\" (1.613 m)   Previously, there was questionable 6 pound weight loss since my earlier visit on 03/26/18 when she weighed 163 pounds.  Current weight is listed at 159.4 pounds, up 2.4 pounds since my most recent visit.  Body mass index is 27.79 kg/(m^2).    EXAMINATION:   General: Extremely pleasant but quite confused.  She asks, \"how do we know each other so dearly?\"  She does not know where she is, and she asks me, \"did you ever go swimming?\"  She is not sure why she asked that.  She says it just popped into her head.  Currently, she is not wearing her TLSO in bed.   Head: Normocephalic and atraumatic.   Eyes: PERRLA, sclerae clear.   ENT: Moist oral mucosa.  She has her own teeth.  No rhinorrhea or nasal discharge.  Hearing seems unimpaired.  Cardiovascular: Regular rate and rhythm without appreciable murmur.  Respiratory: Lungs are clear to auscultation bilaterally.  Abdomen: Soft and nontender with positive bowel sounds.  Musculoskeletal/Extremities: Age-related degenerative joint disease.  No peripheral edema.  Integument: No rashes, clinically significant lesions, or skin breakdown.   Cognitive/Psychiatric: Significant confusion for quite pleasant.  She scored 3/15 on the BIMS on 03/14/18 and then scored 4/15 on 03/22/18.    DIAGNOSTICS:   Results for orders placed or performed during " the hospital encounter of 08/21/17   Basic Metabolic Panel   Result Value Ref Range    Sodium 139 136 - 145 mmol/L    Potassium 4.3 3.5 - 5.0 mmol/L    Chloride 101 98 - 107 mmol/L    CO2 31 22 - 31 mmol/L    Anion Gap, Calculation 7 5 - 18 mmol/L    Glucose 94 70 - 125 mg/dL    Calcium 10.0 8.5 - 10.5 mg/dL    BUN 16 8 - 28 mg/dL    Creatinine 0.85 0.60 - 1.10 mg/dL    GFR MDRD Af Amer >60 >60 mL/min/1.73m2    GFR MDRD Non Af Amer >60 >60 mL/min/1.73m2     Lab Results   Component Value Date    WBC 9.1 03/07/2018    HGB 14.3 03/07/2018    HCT 42.8 03/07/2018     (H) 03/07/2018     03/07/2018     CrCl cannot be calculated (Patient's most recent sCr result is older than the maximum 5 days allowed.).  Lab Results   Component Value Date    ALT 17 03/07/2018    AST 26 03/07/2018    ALKPHOS 81 03/07/2018    BILITOT 0.7 03/07/2018     ASSESSMENT/Plan:      ICD-10-CM    1. T12 compression fracture S22.080A    2. Late onset Alzheimer's disease without behavioral disturbance G30.1     F02.80    3. Major depressive disorder, single episode, mild F32.0    4. Chronic constipation K59.09    5. Osteoarthrosis M19.90    6. Hypothyroidism, unspecified type E03.9    7. Osteoporosis M81.0      CHANGES:    None.    CARE PLAN:    The care plan has been reviewed and all orders signed. Changes to care plan, if any, as noted. Otherwise, continue care plan of care.    The above has been created using voice recognition software. Please be aware that this may unintentionally  produce inaccuracies and/or nonsensical sentences.      Electronically signed by: Kemal Ojeda CNP

## 2021-06-17 NOTE — PROGRESS NOTES
[facility]    Name:   Camille Vang  : 1931  Facility:   [facility] [[id number]]   Room: TCU3-314  Code Status: FULL CODE -   Fac type:   SNF (Skilled Nursing Facility, TCU) -     CHIEF COMPLAINT / REASON FOR VISIT:  Chief Complaint   Patient presents with     Review Of Multiple Medical Conditions     TCU follow-up after hospitalization for a T12 compression fracture.   [facility] from 18 until 03/10/18    Patient was seen by me on 18.    HPI: Camille is an 86 y.o. female with underlying Alzheimer's dementia but without any behavioral disturbance.  She had suffered a prior fall and presented with persistent back pain radiating to the abdomen.  A CT of the abdomen and pelvis showed a T12 compression fracture with retropulsion.  She did not experience any associated neurological deficits.    MRI with moderate T12 compression fracture with 4 mm retropulsion and 50% height loss.  Follow-up thoracic x-ray with TLSO with 70% height loss, severe compression fracture but better retropulsion.  Neurosurgery recommended conservative management.  Her pain was well-controlled with acetaminophen and no opioids.  She has a large number of listed allergies.  PT/OT was recommended, and she was transferred here to the TCU.  Her donepezil was decreased to avoid further risk of falls.  She is to follow-up with neurosurgery in 2 weeks.    Other issues: She has hypothyroidism and is on 75 mcg of levothyroxine daily.      CURRENT ISSUES    She does have a diagnosis of depression, although she is quite pleasant, rather humorous, and is always smiling broadly when I see her.  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  Dementia is at least moderate.  She has scored 2-4/15 on the BIMS assessment since her admission and, while she seems much more oriented than her score would suggest, she is very confused, and I could easily see how she would become  hostile.      She does need help in feeding, as she will sit and look at her food without constant coaxing.  She has lost a few pounds despite getting Mighty Shake, added on 03/16/18.    She has a low bed and floor pad alongside in case she should attempt to get up on her own.    She wears a TLSO brace, and her pain is improved now that she is on scheduled acetaminophen.  She does require extensive assist for transfer from her bed to her wheelchair but, once she is up, she seems to be ambulating just fine.  She is quite compliant with cares.    She is currently residing at [facility] in [place].    ROS: She denies any current back pain.  She denies any shooting pains down the legs.  No mention of headaches or chest pains, coughing or congestion, nausea or vomiting, dizziness or dyspnea, dysuria, constipation or diarrhea, difficulty chewing or swallowing, integumentary issues, problems with appetite or sleep.    Past Medical History:   Diagnosis Date     B12 deficiency 6/2/2015     Chronic constipation      Dementia without behavioral disturbance, unspecified dementia type 11/11/2015     Hypercholesteremia      Hypothyroidism      Major depressive disorder, single episode, mild 4/3/2016     Osteoarthrosis      Osteoporosis 11/5/2002    Overview:  Problem list name updated by automated process. Provider to review              Family History   Problem Relation Age of Onset     Brain cancer Mother      Diabetes type II Mother      Stroke Father      Heart disease Brother      Diabetes type II Brother      Dementia Brother      NPH     Dementia Brother      NPH     Leukemia Brother      Cancer Brother      bladder     Social History     Social History     Marital status:      Spouse name: N/A     Number of children: 4     Years of education: N/A     Social History Main Topics     Smoking status: Never Smoker     Smokeless tobacco: Never Used     Alcohol use No     Drug use: No     Sexual activity: Not  "on file     Other Topics Concern     Not on file     Social History Narrative    2 children have      MEDICATIONS: Reviewed from the MAR, physician orders, and earlier progress notes.  Current Outpatient Prescriptions   Medication Sig     acetaminophen (TYLENOL) 500 MG tablet Take 1,000 mg by mouth 3 (three) times a day.     calcium carbonate (OS-ANISA) 600 mg calcium (1,500 mg) tablet Take 600 mg by mouth 2 (two) times a day with meals.     cyanocobalamin 1,000 mcg/mL injection Inject 1,000 mcg into the shoulder, thigh, or buttocks every 30 (thirty) days. Day 20 of every month.     donepezil (ARICEPT) 10 MG tablet Take 0.5 tablets (5 mg total) by mouth at bedtime.     gabapentin (NEURONTIN) 100 MG capsule Take 100 mg by mouth 2 (two) times a day. At 10:00 and at bedtime.     gauze bandage (KERLIX) 2 1/4 X 3 \"-yard Bndg rebandage hand by wrapping individually around fingers loosely, and then bulky wrapping around hand to wrist twice daily or when soaked through. Disp 14 rolls     levothyroxine (SYNTHROID, LEVOTHROID) 75 MCG tablet Take 75 mcg by mouth daily.     multivit with min-folic acid (ONE-A-DAY VITACRAVES) 200 mcg Chew Chew 1 tablet daily.     ALLERGIES:   Allergies   Allergen Reactions     Sulfa (Sulfonamide Antibiotics) Anaphylaxis and Other (See Comments)     Visual disturbances     Actonel [Risedronate]      Adhesive Tape-Silicones      Amitriptyline Other (See Comments)     Agitation, anxiety     Augmentin [Amoxicillin-Pot Clavulanate]      Benadryl [Diphenhydramine Hcl] Other (See Comments)     sedation     Bergamot Oil      Bisphosphonates      Fosamax and Actonel     Celecoxib      Chlordiazepoxide Hcl Nausea And Vomiting     Codeine      Crestor [Rosuvastatin] Other (See Comments) and Dizziness     Stomach cramps     Cyclobenzaprine      Epinephrine Other (See Comments)     Weak, racing heart     Erythromycin      Erythromycin Base      Fosamax [Alendronate]      Guaifenesin Other (See Comments)    " " Achy, heart pounds     Ibuprofen      Iron Nausea And Vomiting     Latex      Lunesta [Eszopiclone]      Macrolide Antibiotics      Erythromycin     Melatonin Other (See Comments)     Loss of balance     Meperidine Other (See Comments)     Hallucinations     Morphine Headache     Naproxen      Niacin      Nsaids (Non-Steroidal Anti-Inflammatory Drug)      Naproxen, ibuprofen, Vioxx, Celebrex     Oxycodone      Paroxetine      Penicillins      Augmentin     Pentazocine      Propoxyphene      Soap      Antibacterial soap     Trazodone Other (See Comments)     Suicidal       Venom-Honey Bee      Vioxx [Rofecoxib] Nausea Only     Voltaren [Diclofenac Sodium]      Zoloft [Sertraline]      DIET: Regular, regular texture, thin liquids.  Mighty Shake.    Vitals:    04/09/18 1809   BP: 101/61   Pulse: 76   Resp: 18   Temp: 97.3  F (36.3  C)   Weight: 157 lb 3.2 oz (71.3 kg)   Height: 5' 3.5\" (1.613 m)     Body mass index is 27.41 kg/(m^2).    EXAMINATION:   General: Extremely pleasant but quite confused elderly female, sitting at the breakfast table, in no apparent distress.  One of the nursing assistants is trying to coax her to eat.  Head: Normocephalic and atraumatic.   Eyes: PERRLA, sclerae clear.   ENT: Moist oral mucosa.  She has her own teeth.  No rhinorrhea or nasal discharge.  Hearing seems unimpaired.  Cardiovascular: Regular rate and rhythm without appreciable murmur.  Respiratory: Lungs are clear to auscultation bilaterally.  Abdomen: Soft and nontender with positive bowel sounds.  Musculoskeletal/Extremities: Age-related degenerative joint disease.  No peripheral edema.  Integument: No rashes, clinically significant lesions, or skin breakdown.   Cognitive/Psychiatric: Significant confusion for quite pleasant.  She scored 3/15 on the BIMS on 03/14/18 and then scored 4/15 on 03/22/18, followed by 2/15 on 04/06/18.    DIAGNOSTICS:   Results for orders placed or performed during the hospital encounter of 08/21/17 "   Basic Metabolic Panel   Result Value Ref Range    Sodium 139 136 - 145 mmol/L    Potassium 4.3 3.5 - 5.0 mmol/L    Chloride 101 98 - 107 mmol/L    CO2 31 22 - 31 mmol/L    Anion Gap, Calculation 7 5 - 18 mmol/L    Glucose 94 70 - 125 mg/dL    Calcium 10.0 8.5 - 10.5 mg/dL    BUN 16 8 - 28 mg/dL    Creatinine 0.85 0.60 - 1.10 mg/dL    GFR MDRD Af Amer >60 >60 mL/min/1.73m2    GFR MDRD Non Af Amer >60 >60 mL/min/1.73m2     Lab Results   Component Value Date    WBC 9.1 03/07/2018    HGB 14.3 03/07/2018    HCT 42.8 03/07/2018     (H) 03/07/2018     03/07/2018     CrCl cannot be calculated (Patient's most recent sCr result is older than the maximum 5 days allowed.).  Lab Results   Component Value Date    ALT 17 03/07/2018    AST 26 03/07/2018    ALKPHOS 81 03/07/2018    BILITOT 0.7 03/07/2018     ASSESSMENT/Plan:      ICD-10-CM    1. Late onset Alzheimer's disease without behavioral disturbance G30.1     F02.80    2. T12 compression fracture S22.080A    3. Major depressive disorder, single episode, mild F32.0    4. Chronic constipation K59.09    5. Osteoarthrosis M19.90    6. Hypothyroidism, unspecified type E03.9    7. Osteoporosis M81.0      CHANGES:    None.    CARE PLAN:    The care plan has been reviewed and all orders signed. Changes to care plan, if any, as noted. Otherwise, continue care plan of care.    The above has been created using voice recognition software. Please be aware that this may unintentionally  produce inaccuracies and/or nonsensical sentences.      Electronically signed by: Kemal Ojeda CNP

## 2021-06-18 NOTE — PROGRESS NOTES
CHART NOTE     DATE OF SERVICE:  2018     : 1931   86 y.o.     ASSESSMENT :   Doing well with improvement in symptoms and function.      PLAN: Wean from brace. Loosen restrictions. Refer to PT. RTC prn. Enc to call with any new questions or concerns.     HPI:  Camille Vang is status post consult by DR. CALDERÓN  On 3-8-18 for T12 FRACTURE.  Patient initially presented with past medical history significant for underlying dementia, presented after a fall .  MRI with moderate T12 compression fracture with 4 mm retropulsion and 50% height loss.  Follow up thoracic XR with TLSO with 70% height loss, severe compression fx. w/ decreased retropulsion.  Last seen in clinic on 2018 and reported no back or leg pain.  Fx stable on imaging.     TODAY, Camille Vang is in for follow up at 3 months post fx.  She is walking more now with therapy.  She denies pain now or at rest, although is not best historian. Does not appear painful.  Here today with son and other one incident last week where she appeared to have a moment of pain, cannot recall that she acts painful.       PAST MEDICAL HISTORY, SURGICAL HISTORY, REVIEW OF SYMPTOMS, MEDICATIONS AND ALLERGIES:  Past medical history, surgical history, ROS, medications and allergies reviewed with patient and remain unchanged from previous visit.    Past Medical History:   Diagnosis Date     B12 deficiency 2015     Chronic constipation      Dementia without behavioral disturbance, unspecified dementia type 2015     Hypercholesteremia      Hypothyroidism      Major depressive disorder, single episode, mild 4/3/2016     Osteoarthrosis      Osteoporosis 2002    Overview:  Problem list name updated by automated process. Provider to review       PHYSICAL EXAM:      /78  Pulse 68  Resp 18    Neurological exam reveals:  Respirations easy, non-labored.   Skin: W/D/I. No rashes, lesions or breaks in integrity.   Recent and remote memory intact,  fund of knowledge wnl.    Alert and oriented x3, speech fluent and appropriate.    PERRL, EOMI, No nystagmus,   Face symmetric, tongue midline, Uvula midline,  palate rises with phonation   Shoulder shrug equal  Moves all extremities.   No extremity edema noted.   Muscle Bulk and tone wnl.   Reflexes: No pathological reflexes   Gait and station:not observed, in WC     RADIOGRAPHIC IMAGING: XR: severe compression fracture deformity of the T12 vertebral body with approximately 60% height loss, similar to previous exam. Retropulsion of the posterior superior aspect of T12 is unchanged.    Films personally reviewed and interpreted.     Reviewed imaging with patient and family.

## 2021-09-04 ENCOUNTER — HEALTH MAINTENANCE LETTER (OUTPATIENT)
Age: 86
End: 2021-09-04

## 2022-02-19 ENCOUNTER — HEALTH MAINTENANCE LETTER (OUTPATIENT)
Age: 87
End: 2022-02-19

## 2022-10-16 ENCOUNTER — HEALTH MAINTENANCE LETTER (OUTPATIENT)
Age: 87
End: 2022-10-16

## 2023-04-01 ENCOUNTER — HEALTH MAINTENANCE LETTER (OUTPATIENT)
Age: 88
End: 2023-04-01

## 2023-06-22 NOTE — TELEPHONE ENCOUNTER
" [facility] faxed us a reference sheet with Step 2 stating \"qualifying criteria and supporting documentation\". It states that \"all 'yes\" questions must be documented in the chart, with supporting medicaxl necessity documenting the reason\".  Question 5 on the form is \"does the patient requires a bed height different than ta fixed height hospital bed to permit transfer to chair, wheelchair or standing position?\"    In my clinic visit note from 5/1/18, I wrote \"Patient has some difficulty both getting out of bed which requires some assistance for which an adjustable hospital bed would assist and also so that the bed height could be lowered to prevent risk for complications of a fall if patient attempts to get out of bed by herself which has occurred\" and also have diagnosis on that visit of  \"history of fall\" . This would seem to meet the criteria requested. Alli suggested that I write a statement about pt requiring position changes but that is NOT the reason for requesting the bed. I have already written the reason and it IS documented in my note.  I have changed my note in the assessment/plan part #3 to add on that request for the WC and bed is lifetime  That is all I can do.  May submit again a copy of the addended clinic note from 5/1/18 along with the original  Prescriptions for the bed and WC to Alli [facility] fax number [phone]  I will place copy of addended note and other paperwork in [facility] when I return to clinic Monday AM  " Niacinamide Pregnancy And Lactation Text: These medications are considered safe during pregnancy.